# Patient Record
Sex: MALE | Race: BLACK OR AFRICAN AMERICAN | Employment: FULL TIME | URBAN - METROPOLITAN AREA
[De-identification: names, ages, dates, MRNs, and addresses within clinical notes are randomized per-mention and may not be internally consistent; named-entity substitution may affect disease eponyms.]

---

## 2018-02-12 ENCOUNTER — OFFICE VISIT (OUTPATIENT)
Dept: FAMILY MEDICINE CLINIC | Facility: CLINIC | Age: 28
End: 2018-02-12

## 2018-02-12 VITALS
SYSTOLIC BLOOD PRESSURE: 160 MMHG | HEART RATE: 96 BPM | TEMPERATURE: 98 F | DIASTOLIC BLOOD PRESSURE: 125 MMHG | WEIGHT: 315 LBS | HEIGHT: 76 IN | BODY MASS INDEX: 38.36 KG/M2

## 2018-02-12 DIAGNOSIS — I10 ESSENTIAL HYPERTENSION: Primary | ICD-10-CM

## 2018-02-12 DIAGNOSIS — Z02.89 ENCOUNTER FOR PHYSICAL EXAMINATION RELATED TO EMPLOYMENT: ICD-10-CM

## 2018-02-12 DIAGNOSIS — Z99.89 OSA ON CPAP: ICD-10-CM

## 2018-02-12 DIAGNOSIS — E66.01 MORBID OBESITY WITH BMI OF 40.0-44.9, ADULT (HCC): ICD-10-CM

## 2018-02-12 DIAGNOSIS — G47.33 OSA ON CPAP: ICD-10-CM

## 2018-02-12 PROCEDURE — 99214 OFFICE O/P EST MOD 30 MIN: CPT | Performed by: FAMILY MEDICINE

## 2018-02-12 PROCEDURE — 99212 OFFICE O/P EST SF 10 MIN: CPT | Performed by: FAMILY MEDICINE

## 2018-02-12 RX ORDER — LOSARTAN POTASSIUM AND HYDROCHLOROTHIAZIDE 25; 100 MG/1; MG/1
1 TABLET ORAL DAILY
Qty: 90 TABLET | Refills: 1 | Status: SHIPPED | OUTPATIENT
Start: 2018-02-12 | End: 2019-02-07

## 2018-02-12 NOTE — PATIENT INSTRUCTIONS
Monitor blood pressures and record at home. Limit salt intake. Medication reviewed and renewed where needed and appropriate. Comply with medications. Recommend weight loss via daily exercising and consistent healthy dietary changes.   Sleep CPAP titratio

## 2018-02-12 NOTE — PROGRESS NOTES
HPI:    Patient ID: Jimenez Brooke is a 24year old male. 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 Hypertension   This is a chronic problem.  The current gallop. Edema not present. Carotid bruit not present. Pulmonary/Chest: Effort normal and breath sounds normal. No respiratory distress. Neurological: He is alert and oriented to person, place, and time.  No cranial nerve deficit, sensory deficit or mo

## 2018-02-26 ENCOUNTER — NURSE TRIAGE (OUTPATIENT)
Dept: OTHER | Age: 28
End: 2018-02-26

## 2018-02-26 NOTE — TELEPHONE ENCOUNTER
Action Requested: Summary for Provider     []  Critical Lab, Recommendations Needed  [] Need Additional Advice  []   FYI    []   Need Orders  [] Need Medications Sent to Pharmacy  []  Other     SUMMARY: Pt asking to schedule appt with Dr. Anuja potter

## 2018-02-28 ENCOUNTER — OFFICE VISIT (OUTPATIENT)
Dept: FAMILY MEDICINE CLINIC | Facility: CLINIC | Age: 28
End: 2018-02-28

## 2018-02-28 VITALS
SYSTOLIC BLOOD PRESSURE: 169 MMHG | HEART RATE: 81 BPM | DIASTOLIC BLOOD PRESSURE: 103 MMHG | HEIGHT: 76 IN | WEIGHT: 315 LBS | BODY MASS INDEX: 38.36 KG/M2 | RESPIRATION RATE: 14 BRPM | TEMPERATURE: 98 F

## 2018-02-28 DIAGNOSIS — I10 ESSENTIAL HYPERTENSION: ICD-10-CM

## 2018-02-28 DIAGNOSIS — M25.571 ACUTE RIGHT ANKLE PAIN: Primary | ICD-10-CM

## 2018-02-28 PROCEDURE — 99214 OFFICE O/P EST MOD 30 MIN: CPT | Performed by: FAMILY MEDICINE

## 2018-02-28 PROCEDURE — 99212 OFFICE O/P EST SF 10 MIN: CPT | Performed by: FAMILY MEDICINE

## 2018-03-06 NOTE — PROGRESS NOTES
HPI:    Cayla Mathews is a 24year old male presents to clinic with right sided ankle pain. 3 weeks back, patient was working and he twisted his ankle. States that he was able to get up and walk but it was very painful.  Notes swelling that was worse the ne thyromegaly present. Cardiovascular: Normal rate, regular rhythm and normal heart sounds. Pulmonary/Chest: Effort normal and breath sounds normal. No respiratory distress. He has no wheezes. He has no rales.    Musculoskeletal:   Right Ankle - mild dif

## 2018-03-15 ENCOUNTER — OFFICE VISIT (OUTPATIENT)
Dept: FAMILY MEDICINE CLINIC | Facility: CLINIC | Age: 28
End: 2018-03-15

## 2018-03-15 VITALS
TEMPERATURE: 98 F | HEART RATE: 87 BPM | BODY MASS INDEX: 38.36 KG/M2 | DIASTOLIC BLOOD PRESSURE: 80 MMHG | RESPIRATION RATE: 17 BRPM | SYSTOLIC BLOOD PRESSURE: 110 MMHG | WEIGHT: 315 LBS | HEIGHT: 76 IN

## 2018-03-15 DIAGNOSIS — M25.571 CHRONIC PAIN OF RIGHT ANKLE: Primary | ICD-10-CM

## 2018-03-15 DIAGNOSIS — M79.671 CHRONIC PAIN IN RIGHT FOOT: ICD-10-CM

## 2018-03-15 DIAGNOSIS — Z99.89 OSA ON CPAP: ICD-10-CM

## 2018-03-15 DIAGNOSIS — G47.33 OSA ON CPAP: ICD-10-CM

## 2018-03-15 DIAGNOSIS — G89.29 CHRONIC PAIN OF RIGHT ANKLE: Primary | ICD-10-CM

## 2018-03-15 DIAGNOSIS — M76.821 POSTERIOR TIBIAL TENDINITIS OF RIGHT LEG: ICD-10-CM

## 2018-03-15 DIAGNOSIS — R09.81 NASAL SINUS CONGESTION: ICD-10-CM

## 2018-03-15 DIAGNOSIS — I10 ESSENTIAL HYPERTENSION: ICD-10-CM

## 2018-03-15 DIAGNOSIS — S86.311A STRAIN OF PERONEAL TENDON OF RIGHT FOOT, INITIAL ENCOUNTER: ICD-10-CM

## 2018-03-15 DIAGNOSIS — G89.29 CHRONIC PAIN IN RIGHT FOOT: ICD-10-CM

## 2018-03-15 PROCEDURE — 99214 OFFICE O/P EST MOD 30 MIN: CPT | Performed by: FAMILY MEDICINE

## 2018-03-15 PROCEDURE — 99212 OFFICE O/P EST SF 10 MIN: CPT | Performed by: FAMILY MEDICINE

## 2018-03-15 RX ORDER — FLUTICASONE PROPIONATE 50 MCG
2 SPRAY, SUSPENSION (ML) NASAL DAILY
Qty: 1 BOTTLE | Refills: 5 | Status: SHIPPED | OUTPATIENT
Start: 2018-03-15 | End: 2019-03-10

## 2018-03-15 NOTE — PROGRESS NOTES
HPI:    Patient ID: David Slater is a 24year old male. Ankle Injury    The incident occurred more than 1 week ago. Incident location: undetermined. Injury mechanism: Not sure of mechanism. The pain is present in the right foot and right ankle.  The quali Take 1 tablet (5 mg total) by mouth daily.  Disp: 30 tablet Rfl: 1     Allergies:No Known Allergies     03/15/18  1007 03/15/18  1032   BP: (!) 154/107 110/80   Pulse: 87    Resp: 17    Temp: 97.8 °F (36.6 °C)    TempSrc: Oral    Weight: (!) 340 lb (154.2 k Monitor blood pressures and record at home. Limit salt intake. Recommend weight loss via daily exercising and consistent healthy dietary changes. Medication reviewed and renewed where needed and appropriate. Comply with medications.   Keep CPAP titrati

## 2018-03-15 NOTE — PATIENT INSTRUCTIONS
Monitor blood pressures and record at home. Limit salt intake. Recommend weight loss via daily exercising and consistent healthy dietary changes. Medication reviewed and renewed where needed and appropriate. Comply with medications.   Keep CPAP titration

## 2018-04-03 ENCOUNTER — HOSPITAL ENCOUNTER (OUTPATIENT)
Dept: GENERAL RADIOLOGY | Facility: HOSPITAL | Age: 28
Discharge: HOME | End: 2018-04-03
Attending: ORTHOPAEDIC SURGERY
Payer: COMMERCIAL

## 2018-04-03 ENCOUNTER — OFFICE VISIT (OUTPATIENT)
Dept: ORTHOPEDICS CLINIC | Facility: CLINIC | Age: 28
End: 2018-04-03

## 2018-04-03 DIAGNOSIS — M79.671 RIGHT FOOT PAIN: ICD-10-CM

## 2018-04-03 DIAGNOSIS — M21.41 ACQUIRED PES PLANUS, RIGHT: Primary | ICD-10-CM

## 2018-04-03 DIAGNOSIS — M25.571 RIGHT ANKLE PAIN, UNSPECIFIED CHRONICITY: ICD-10-CM

## 2018-04-03 PROCEDURE — 99243 OFF/OP CNSLTJ NEW/EST LOW 30: CPT | Performed by: ORTHOPAEDIC SURGERY

## 2018-04-03 PROCEDURE — 99212 OFFICE O/P EST SF 10 MIN: CPT | Performed by: ORTHOPAEDIC SURGERY

## 2018-04-03 PROCEDURE — 73630 X-RAY EXAM OF FOOT: CPT | Performed by: ORTHOPAEDIC SURGERY

## 2018-04-03 PROCEDURE — 73610 X-RAY EXAM OF ANKLE: CPT | Performed by: ORTHOPAEDIC SURGERY

## 2018-04-03 NOTE — PROGRESS NOTES
4/3/2018  Juan Taylor  12/26/1990  24year old   male  Grayson Berman MD    HPI:   Patient presents with:   Ankle Pain: Right - onset about 1 mo ago while running on the tredmill when he lost his balance and after that the ankle and foot  was hurting - tobacco: Never Used                      Alcohol use: Yes           0.0 oz/week     Comment: social          REVIEW OF SYSTEMS:   A 12 point review of systems was performed as documented on the intake form and reviewed by me today with pertinent positives needed. All of their questions were answered and they are in agreement with the treatment plan.

## 2019-01-01 ENCOUNTER — E-VISIT (OUTPATIENT)
Dept: FAMILY MEDICINE CLINIC | Facility: CLINIC | Age: 29
End: 2019-01-01

## 2019-01-01 DIAGNOSIS — K52.9 GASTROENTERITIS: Primary | ICD-10-CM

## 2019-01-01 RX ORDER — ONDANSETRON 4 MG/1
4 TABLET, FILM COATED ORAL EVERY 8 HOURS PRN
Qty: 12 TABLET | Refills: 0 | Status: SHIPPED | OUTPATIENT
Start: 2019-01-01 | End: 2019-01-04

## 2019-01-01 NOTE — PATIENT INSTRUCTIONS
Noninfectious Gastroenteritis (Adult)    Gastroenteritis can cause nausea, vomiting, diarrhea, and cramping in the belly.  This may occur from food sensitivity, inflammation of your gastrointestinal tract, medicines, stress, or other causes not related to · If you eat, avoid fatty, greasy, spicy, or fried foods. · Don't eat dairy products if you have diarrhea; they can make the diarrhea worse.   During the first 24 hours (the first full day), follow the diet below:  · Beverages: Water, clear liquids, soft d · Stiff neck  When to seek medical advice  Call your healthcare provider right away if any of these occur:   · Increasing belly pain or constant lower right belly pain  · Continued vomiting (unable to keep liquids down)  · Frequent diarrhea (more than 5 ti

## 2019-01-01 NOTE — PROGRESS NOTES
Patient c/o NVD for a few days, denies melena or hematochezia. Denies severe abdominal pain. Able to maintain fluids. Mentions diarrhea improving. Rx for zofran, work note provided. Likely viral origin vs food poisoning.  Advised f/u in a few days if diarrh

## 2019-04-11 ENCOUNTER — APPOINTMENT (OUTPATIENT)
Dept: LAB | Facility: HOSPITAL | Age: 29
End: 2019-04-11
Attending: FAMILY MEDICINE

## 2019-04-11 ENCOUNTER — OFFICE VISIT (OUTPATIENT)
Dept: FAMILY MEDICINE CLINIC | Facility: CLINIC | Age: 29
End: 2019-04-11

## 2019-04-11 VITALS
HEART RATE: 83 BPM | BODY MASS INDEX: 37.58 KG/M2 | SYSTOLIC BLOOD PRESSURE: 166 MMHG | WEIGHT: 315 LBS | DIASTOLIC BLOOD PRESSURE: 98 MMHG | HEIGHT: 76.75 IN | TEMPERATURE: 97 F

## 2019-04-11 DIAGNOSIS — G47.33 OSA (OBSTRUCTIVE SLEEP APNEA): ICD-10-CM

## 2019-04-11 DIAGNOSIS — I10 ESSENTIAL HYPERTENSION: ICD-10-CM

## 2019-04-11 DIAGNOSIS — Z00.00 ANNUAL PHYSICAL EXAM: Primary | ICD-10-CM

## 2019-04-11 PROCEDURE — 90715 TDAP VACCINE 7 YRS/> IM: CPT | Performed by: FAMILY MEDICINE

## 2019-04-11 PROCEDURE — 36415 COLL VENOUS BLD VENIPUNCTURE: CPT

## 2019-04-11 PROCEDURE — 99395 PREV VISIT EST AGE 18-39: CPT | Performed by: FAMILY MEDICINE

## 2019-04-11 PROCEDURE — 90471 IMMUNIZATION ADMIN: CPT | Performed by: FAMILY MEDICINE

## 2019-04-11 RX ORDER — LOSARTAN POTASSIUM AND HYDROCHLOROTHIAZIDE 12.5; 5 MG/1; MG/1
1 TABLET ORAL DAILY
Qty: 90 TABLET | Refills: 0 | Status: SHIPPED | OUTPATIENT
Start: 2019-04-11 | End: 2019-06-26

## 2019-04-16 NOTE — PROGRESS NOTES
HPI:    Flakita Lira is a 34year old male presents to clinic as a new patient to establish care. Has a history of hypertension and sleep apnea. Has not taken medication or used a CPAP machine in many months.  Patient denies HAs, blurry vision, nausea, v atraumatic.    Right Ear: Tympanic membrane, external ear and ear canal normal.   Left Ear: Tympanic membrane, external ear and ear canal normal.   Nose: Nose normal.   Mouth/Throat: Uvula is midline, oropharynx is clear and moist and mucous membranes are n REFLEX TO FREE T4 [75756][Q]      TETANUS, DIPHTHERIA TOXOIDS AND ACELLULAR PERTUSIS VACCINE (TDAP), >7 YEARS, IM USE      Chlamydia/Gc Amplification [74245][Q]      Meds This Visit:  Requested Prescriptions     Signed Prescriptions Disp Refills   • Shayna

## 2019-04-17 ENCOUNTER — OFFICE VISIT (OUTPATIENT)
Dept: FAMILY MEDICINE CLINIC | Facility: CLINIC | Age: 29
End: 2019-04-17
Payer: COMMERCIAL

## 2019-04-17 VITALS
DIASTOLIC BLOOD PRESSURE: 98 MMHG | WEIGHT: 315 LBS | SYSTOLIC BLOOD PRESSURE: 164 MMHG | HEIGHT: 76.75 IN | HEART RATE: 85 BPM | BODY MASS INDEX: 37.58 KG/M2 | TEMPERATURE: 98 F

## 2019-04-17 DIAGNOSIS — I10 ESSENTIAL HYPERTENSION: Primary | ICD-10-CM

## 2019-04-17 PROCEDURE — 99212 OFFICE O/P EST SF 10 MIN: CPT | Performed by: FAMILY MEDICINE

## 2019-04-17 PROCEDURE — 99213 OFFICE O/P EST LOW 20 MIN: CPT | Performed by: FAMILY MEDICINE

## 2019-04-18 NOTE — PROGRESS NOTES
HPI:    Hamida Renteria is a 34year old male presents to clinic for follow-up regarding hypertension. Since last visit, patient has been taking medications daily. Also, has stopped eating fast food, fried foods and is exercising almost daily.   Denies side reinforced. Will double dose of medication for the next week, patient will take 2 tablets of losartan-hydrochlorothiazide daily. Will follow-up in 1 week or sooner if needed. Patient verbalized understanding of information discussed.  No barriers to

## 2019-05-20 ENCOUNTER — OFFICE VISIT (OUTPATIENT)
Dept: FAMILY MEDICINE CLINIC | Facility: CLINIC | Age: 29
End: 2019-05-20
Payer: COMMERCIAL

## 2019-05-20 VITALS
BODY MASS INDEX: 37.58 KG/M2 | TEMPERATURE: 98 F | DIASTOLIC BLOOD PRESSURE: 110 MMHG | HEIGHT: 76.75 IN | OXYGEN SATURATION: 99 % | SYSTOLIC BLOOD PRESSURE: 164 MMHG | HEART RATE: 85 BPM | WEIGHT: 315 LBS

## 2019-05-20 DIAGNOSIS — R79.89 ELEVATED SERUM CREATININE: ICD-10-CM

## 2019-05-20 DIAGNOSIS — I10 ESSENTIAL HYPERTENSION: Primary | ICD-10-CM

## 2019-05-20 PROCEDURE — 99213 OFFICE O/P EST LOW 20 MIN: CPT | Performed by: FAMILY MEDICINE

## 2019-05-20 PROCEDURE — 99212 OFFICE O/P EST SF 10 MIN: CPT | Performed by: FAMILY MEDICINE

## 2019-05-20 RX ORDER — AMLODIPINE BESYLATE 5 MG/1
5 TABLET ORAL DAILY
Qty: 90 TABLET | Refills: 0 | Status: SHIPPED | OUTPATIENT
Start: 2019-05-20 | End: 2019-06-26

## 2019-05-20 NOTE — PROGRESS NOTES
HPI:    Justin Hollins is a 34year old male presents to clinic for follow up regarding HTN. Reports compliance with medications, has missed 2 doses since last visit.  Patient denies HAs, blurry vision, nausea, vomiting, CP, palpitations, dizziness, SOB, or reviewed. ASSESSMENT/PLAN:   (I10) Essential hypertension  (primary encounter diagnosis)  Plan:  - BP elevated. Amlodipine 5 mg added. Continued lifestyle modifications advised.  To follow up in 2 weeks or sooner PRN.     (R88.23) Elevated serum creati

## 2019-06-26 ENCOUNTER — OFFICE VISIT (OUTPATIENT)
Dept: FAMILY MEDICINE CLINIC | Facility: CLINIC | Age: 29
End: 2019-06-26
Payer: COMMERCIAL

## 2019-06-26 VITALS
SYSTOLIC BLOOD PRESSURE: 172 MMHG | WEIGHT: 315 LBS | DIASTOLIC BLOOD PRESSURE: 120 MMHG | BODY MASS INDEX: 37.58 KG/M2 | HEIGHT: 76.75 IN | TEMPERATURE: 98 F | HEART RATE: 74 BPM | OXYGEN SATURATION: 100 %

## 2019-06-26 DIAGNOSIS — I10 UNCONTROLLED HYPERTENSION: Primary | ICD-10-CM

## 2019-06-26 PROCEDURE — 99214 OFFICE O/P EST MOD 30 MIN: CPT | Performed by: FAMILY MEDICINE

## 2019-06-26 PROCEDURE — 93000 ELECTROCARDIOGRAM COMPLETE: CPT | Performed by: FAMILY MEDICINE

## 2019-06-26 PROCEDURE — 99212 OFFICE O/P EST SF 10 MIN: CPT | Performed by: FAMILY MEDICINE

## 2019-06-26 PROCEDURE — 93005 ELECTROCARDIOGRAM TRACING: CPT | Performed by: FAMILY MEDICINE

## 2019-06-26 RX ORDER — LOSARTAN POTASSIUM AND HYDROCHLOROTHIAZIDE 12.5; 5 MG/1; MG/1
1 TABLET ORAL DAILY
Qty: 90 TABLET | Refills: 0 | Status: SHIPPED | OUTPATIENT
Start: 2019-06-26 | End: 2019-09-10 | Stop reason: DRUGHIGH

## 2019-06-26 RX ORDER — LISINOPRIL 10 MG/1
10 TABLET ORAL DAILY
Qty: 90 TABLET | Refills: 0 | Status: SHIPPED | OUTPATIENT
Start: 2019-06-26 | End: 2019-10-07

## 2019-06-26 NOTE — PROGRESS NOTES
HPI:    Forrest Baeza is a 34year old male presents to clinic for follow up regarding HTN. Has not taken meds in about a month due to insurance issues. Has tried to limit salt in his diet and exercise.  Patient denies HAs, blurry vision, nausea, vomiting, hypertension  (primary encounter diagnosis)  Plan: ELECTROCARDIOGRAM, COMPLETE  - BP elevated, patient asymptomatic. EKG done in clinic - no acute changes. Normal sinus rhythm, normal rate. Losartan - HCTZ refilled, lisinopril started.  Patient's creatinine

## 2019-07-01 ENCOUNTER — APPOINTMENT (OUTPATIENT)
Dept: LAB | Age: 29
End: 2019-07-01
Attending: FAMILY MEDICINE
Payer: COMMERCIAL

## 2019-07-01 ENCOUNTER — OFFICE VISIT (OUTPATIENT)
Dept: FAMILY MEDICINE CLINIC | Facility: CLINIC | Age: 29
End: 2019-07-01
Payer: COMMERCIAL

## 2019-07-01 VITALS
SYSTOLIC BLOOD PRESSURE: 148 MMHG | DIASTOLIC BLOOD PRESSURE: 110 MMHG | HEIGHT: 76.75 IN | BODY MASS INDEX: 37.58 KG/M2 | WEIGHT: 315 LBS | TEMPERATURE: 98 F | HEART RATE: 79 BPM

## 2019-07-01 DIAGNOSIS — I10 ESSENTIAL HYPERTENSION: Primary | ICD-10-CM

## 2019-07-01 PROCEDURE — 99212 OFFICE O/P EST SF 10 MIN: CPT | Performed by: FAMILY MEDICINE

## 2019-07-01 PROCEDURE — 99213 OFFICE O/P EST LOW 20 MIN: CPT | Performed by: FAMILY MEDICINE

## 2019-07-01 NOTE — PROGRESS NOTES
HPI:    Jefry Ramirez is a 34year old male presents to clinic for follow-up regarding hypertension. Reports compliance with both medications since last visit. Denies any symptoms or side effects.  Patient denies HAs, blurry vision, nausea, vomiting, CP, will refer to renal. Patient agreeable with plan. Responsible party/patient verbalized understanding of information discussed. No barriers to learning observed.            Orders This Visit:  Orders Placed This Encounter      Basic Metabolic Panel (8) [

## 2019-09-07 ENCOUNTER — DIAGNOSTIC TRANS (OUTPATIENT)
Dept: OTHER | Age: 29
End: 2019-09-07

## 2019-09-07 PROCEDURE — 93010 ELECTROCARDIOGRAM REPORT: CPT | Performed by: INTERNAL MEDICINE

## 2019-09-07 PROCEDURE — 99285 EMERGENCY DEPT VISIT HI MDM: CPT | Performed by: EMERGENCY MEDICINE

## 2019-09-08 PROCEDURE — 93010 ELECTROCARDIOGRAM REPORT: CPT | Performed by: INTERNAL MEDICINE

## 2019-09-10 ENCOUNTER — APPOINTMENT (OUTPATIENT)
Dept: LAB | Age: 29
End: 2019-09-10
Attending: FAMILY MEDICINE
Payer: COMMERCIAL

## 2019-09-10 ENCOUNTER — OFFICE VISIT (OUTPATIENT)
Dept: FAMILY MEDICINE CLINIC | Facility: CLINIC | Age: 29
End: 2019-09-10
Payer: COMMERCIAL

## 2019-09-10 VITALS
WEIGHT: 315 LBS | SYSTOLIC BLOOD PRESSURE: 174 MMHG | DIASTOLIC BLOOD PRESSURE: 141 MMHG | TEMPERATURE: 98 F | HEART RATE: 94 BPM | BODY MASS INDEX: 41 KG/M2 | RESPIRATION RATE: 16 BRPM

## 2019-09-10 DIAGNOSIS — F43.21 GRIEVING: ICD-10-CM

## 2019-09-10 DIAGNOSIS — I10 ESSENTIAL HYPERTENSION: Primary | ICD-10-CM

## 2019-09-10 DIAGNOSIS — R79.89 ELEVATED SERUM CREATININE: ICD-10-CM

## 2019-09-10 DIAGNOSIS — K21.9 GASTROESOPHAGEAL REFLUX DISEASE, ESOPHAGITIS PRESENCE NOT SPECIFIED: ICD-10-CM

## 2019-09-10 LAB
ALBUMIN SERPL-MCNC: 4 G/DL (ref 3.4–5)
ANION GAP SERPL CALC-SCNC: 5 MMOL/L (ref 0–18)
BUN BLD-MCNC: 12 MG/DL (ref 7–18)
BUN/CREAT SERPL: 7.8 (ref 10–20)
CALCIUM BLD-MCNC: 9.3 MG/DL (ref 8.5–10.1)
CHLORIDE SERPL-SCNC: 102 MMOL/L (ref 98–112)
CO2 SERPL-SCNC: 31 MMOL/L (ref 21–32)
CREAT BLD-MCNC: 1.53 MG/DL (ref 0.7–1.3)
GLUCOSE BLD-MCNC: 91 MG/DL (ref 70–99)
OSMOLALITY SERPL CALC.SUM OF ELEC: 285 MOSM/KG (ref 275–295)
PHOSPHATE SERPL-MCNC: 3.1 MG/DL (ref 2.5–4.9)
POTASSIUM SERPL-SCNC: 3.8 MMOL/L (ref 3.5–5.1)
SODIUM SERPL-SCNC: 138 MMOL/L (ref 136–145)

## 2019-09-10 PROCEDURE — 90471 IMMUNIZATION ADMIN: CPT | Performed by: FAMILY MEDICINE

## 2019-09-10 PROCEDURE — 80069 RENAL FUNCTION PANEL: CPT

## 2019-09-10 PROCEDURE — 99214 OFFICE O/P EST MOD 30 MIN: CPT | Performed by: FAMILY MEDICINE

## 2019-09-10 PROCEDURE — 90686 IIV4 VACC NO PRSV 0.5 ML IM: CPT | Performed by: FAMILY MEDICINE

## 2019-09-10 PROCEDURE — 36415 COLL VENOUS BLD VENIPUNCTURE: CPT

## 2019-09-10 RX ORDER — LOSARTAN POTASSIUM AND HYDROCHLOROTHIAZIDE 25; 100 MG/1; MG/1
1 TABLET ORAL DAILY
Qty: 90 TABLET | Refills: 0 | Status: SHIPPED | OUTPATIENT
Start: 2019-09-10 | End: 2019-11-08

## 2019-09-10 NOTE — PROGRESS NOTES
HPI:    Emily Baez is a 34year old male presents to clinic for follow-up regarding hypertension. Reports compliance with medications, denies side effects.   Patient's grandfather recently passed, is under a lot of stress, constantly feels anxious/sad b kg)     Physical Exam   Constitutional: No distress. HENT:   Head: Normocephalic and atraumatic.    Right Ear: Tympanic membrane, external ear and ear canal normal.   Left Ear: Tympanic membrane, external ear and ear canal normal.   Nose: Nose normal.   M year.  Patient will monitor this on his own for now, follow-up as needed    Responsible party/patient verbalized understanding of information discussed. No barriers to learning observed.         Orders This Visit:  Orders Placed This Encounter      Renal Fu

## 2019-09-10 NOTE — PATIENT INSTRUCTIONS
GERD (Adult)    The esophagus is a tube that carries food from the mouth to the stomach. A valve (the LES, lower esophageal sphincter) at the lower end of the esophagus prevents stomach acid from flowing upward.  When this valve doesn't work properly, sto · If your symptoms occur during sleep, use a foam wedge to elevate your upper body (not just your head.) Or, place 4\" blocks under the head of your bed. Or use 2 bed risers under your bedframe.   Medicines  If needed, medicines can help relieve the symptom © 1038-3286 [employer] [address]. [address] [facility], [address] [place]. All rights reserved. This information is not intended as a substitute for professional medical care. Always follow your healthcare professional's instructions.         Tips to · Try limiting chocolate, peppermint, and spearmint. These can worsen acid reflux in some people. Watch when you eat  · Avoid lying down for 3 hours after eating. · Do not snack before going to bed.   Raise your head  Raising your head and upper body by 4 Lifestyle changes can help reduce symptoms. If needed, your healthcare provider may prescribe medicines. Symptoms often improve with treatment, but if treatment is stopped, the symptoms often return after a few months.  So most persons with GERD will need t Follow up with your healthcare provider or as advised by our staff.   When to seek medical advice  Call your healthcare provider if any of the following occur:  · Stomach pain gets worse or moves to the lower right abdomen (appendix area)  · Chest pain appe

## 2019-09-24 ENCOUNTER — OFFICE VISIT (OUTPATIENT)
Dept: FAMILY MEDICINE CLINIC | Facility: CLINIC | Age: 29
End: 2019-09-24
Payer: COMMERCIAL

## 2019-09-24 VITALS
RESPIRATION RATE: 16 BRPM | DIASTOLIC BLOOD PRESSURE: 98 MMHG | BODY MASS INDEX: 41 KG/M2 | SYSTOLIC BLOOD PRESSURE: 150 MMHG | HEART RATE: 77 BPM | WEIGHT: 315 LBS | TEMPERATURE: 98 F

## 2019-09-24 DIAGNOSIS — I10 ESSENTIAL HYPERTENSION: Primary | ICD-10-CM

## 2019-09-24 PROCEDURE — 99213 OFFICE O/P EST LOW 20 MIN: CPT | Performed by: FAMILY MEDICINE

## 2019-09-24 NOTE — PROGRESS NOTES
HPI:    Sandie Olvera is a 34year old male presents to clinic for follow-up regarding hypertension. Reports compliance with medications, denies side effects. Has been trying to avoid fast foods, salty foods. Is exercising 2-3 times a week on average. normal.   Neck: Normal range of motion. Neck supple. No thyromegaly present. Cardiovascular: Normal rate, regular rhythm and normal heart sounds. No murmur heard. Pulmonary/Chest: Effort normal and breath sounds normal. No respiratory distress.  He has

## 2019-10-07 ENCOUNTER — PATIENT MESSAGE (OUTPATIENT)
Dept: FAMILY MEDICINE CLINIC | Facility: CLINIC | Age: 29
End: 2019-10-07

## 2019-10-07 ENCOUNTER — OFFICE VISIT (OUTPATIENT)
Dept: NEPHROLOGY | Facility: CLINIC | Age: 29
End: 2019-10-07
Payer: COMMERCIAL

## 2019-10-07 VITALS
SYSTOLIC BLOOD PRESSURE: 153 MMHG | DIASTOLIC BLOOD PRESSURE: 98 MMHG | BODY MASS INDEX: 38.36 KG/M2 | TEMPERATURE: 97 F | HEIGHT: 76 IN | HEART RATE: 91 BPM | WEIGHT: 315 LBS

## 2019-10-07 DIAGNOSIS — N18.2 CKD (CHRONIC KIDNEY DISEASE) STAGE 2, GFR 60-89 ML/MIN: Primary | ICD-10-CM

## 2019-10-07 PROCEDURE — 99243 OFF/OP CNSLTJ NEW/EST LOW 30: CPT | Performed by: INTERNAL MEDICINE

## 2019-10-07 RX ORDER — AMLODIPINE BESYLATE 10 MG/1
10 TABLET ORAL DAILY
Qty: 90 TABLET | Refills: 1 | Status: SHIPPED | OUTPATIENT
Start: 2019-10-07 | End: 2020-04-24

## 2019-10-08 NOTE — TELEPHONE ENCOUNTER
Clarified message with patient. Patient was looking for order for CPAP titration test. Order in the system from 04/2019 and patient provided with telephone number.

## 2019-10-08 NOTE — TELEPHONE ENCOUNTER
From: Greta Pete  To: Roxane Gerard MD  Sent: 10/7/2019 2:51 PM CDT  Subject: Referral Rohini Quiles is it possible to get a sleep apnea referral

## 2019-10-09 ENCOUNTER — ORDER TRANSCRIPTION (OUTPATIENT)
Dept: SLEEP CENTER | Age: 29
End: 2019-10-09

## 2019-10-09 ENCOUNTER — PATIENT MESSAGE (OUTPATIENT)
Dept: NEPHROLOGY | Facility: CLINIC | Age: 29
End: 2019-10-09

## 2019-10-09 DIAGNOSIS — G47.33 OSA (OBSTRUCTIVE SLEEP APNEA): Primary | ICD-10-CM

## 2019-10-09 NOTE — TELEPHONE ENCOUNTER
From: Deondre Dougherty  To: Corinne Junior, MD  Sent: 10/9/2019 12:57 PM CDT  Subject: Visit Cecy Rod do you think I can get a doctor note for Monday my job is asking for one

## 2019-10-10 ENCOUNTER — TELEPHONE (OUTPATIENT)
Dept: NEPHROLOGY | Facility: CLINIC | Age: 29
End: 2019-10-10

## 2019-10-10 NOTE — PROGRESS NOTES
DEAR Loco  Thank you for the referral of Lavinia Marie . Bharti Tran is an  intelligent 32-year-old -American man  Here to evaluate some renal insufficiency and hypertension. Does suffer from obesity his height is 6 4 his weight is 342 with a BMI of 42.   He etc. keep blood pressure less than 140/80 and we will see him back in a month    We will avoid nonsteroidals    Thank you very much Loco Pandey

## 2019-10-10 NOTE — TELEPHONE ENCOUNTER
Loco   please see my consult on Cayla Mathews   hope you are well     regards,    Gabriela Whittaker

## 2019-10-12 ENCOUNTER — OFFICE VISIT (OUTPATIENT)
Dept: SLEEP CENTER | Age: 29
End: 2019-10-12
Attending: FAMILY MEDICINE
Payer: COMMERCIAL

## 2019-10-12 ENCOUNTER — APPOINTMENT (OUTPATIENT)
Dept: GENERAL RADIOLOGY | Facility: HOSPITAL | Age: 29
End: 2019-10-12
Attending: EMERGENCY MEDICINE
Payer: COMMERCIAL

## 2019-10-12 ENCOUNTER — HOSPITAL ENCOUNTER (EMERGENCY)
Facility: HOSPITAL | Age: 29
Discharge: HOME | End: 2019-10-12
Attending: EMERGENCY MEDICINE
Payer: COMMERCIAL

## 2019-10-12 VITALS
BODY MASS INDEX: 42 KG/M2 | OXYGEN SATURATION: 98 % | TEMPERATURE: 98 F | HEART RATE: 69 BPM | WEIGHT: 315 LBS | SYSTOLIC BLOOD PRESSURE: 125 MMHG | RESPIRATION RATE: 15 BRPM | DIASTOLIC BLOOD PRESSURE: 86 MMHG

## 2019-10-12 DIAGNOSIS — R07.9 CHEST PAIN OF UNCERTAIN ETIOLOGY: Primary | ICD-10-CM

## 2019-10-12 DIAGNOSIS — Z76.89 SLEEP CONCERN: Primary | ICD-10-CM

## 2019-10-12 DIAGNOSIS — G47.33 OSA (OBSTRUCTIVE SLEEP APNEA): ICD-10-CM

## 2019-10-12 PROCEDURE — 93010 ELECTROCARDIOGRAM REPORT: CPT | Performed by: EMERGENCY MEDICINE

## 2019-10-12 PROCEDURE — 36415 COLL VENOUS BLD VENIPUNCTURE: CPT

## 2019-10-12 PROCEDURE — 84484 ASSAY OF TROPONIN QUANT: CPT

## 2019-10-12 PROCEDURE — 85025 COMPLETE CBC W/AUTO DIFF WBC: CPT | Performed by: EMERGENCY MEDICINE

## 2019-10-12 PROCEDURE — 85060 BLOOD SMEAR INTERPRETATION: CPT | Performed by: EMERGENCY MEDICINE

## 2019-10-12 PROCEDURE — 84484 ASSAY OF TROPONIN QUANT: CPT | Performed by: EMERGENCY MEDICINE

## 2019-10-12 PROCEDURE — 93005 ELECTROCARDIOGRAM TRACING: CPT

## 2019-10-12 PROCEDURE — 85025 COMPLETE CBC W/AUTO DIFF WBC: CPT

## 2019-10-12 PROCEDURE — 99284 EMERGENCY DEPT VISIT MOD MDM: CPT

## 2019-10-12 PROCEDURE — 80048 BASIC METABOLIC PNL TOTAL CA: CPT

## 2019-10-12 PROCEDURE — 80048 BASIC METABOLIC PNL TOTAL CA: CPT | Performed by: EMERGENCY MEDICINE

## 2019-10-12 PROCEDURE — 85060 BLOOD SMEAR INTERPRETATION: CPT

## 2019-10-12 PROCEDURE — 71046 X-RAY EXAM CHEST 2 VIEWS: CPT | Performed by: EMERGENCY MEDICINE

## 2019-10-12 PROCEDURE — 95811 POLYSOM 6/>YRS CPAP 4/> PARM: CPT

## 2019-10-12 NOTE — ED INITIAL ASSESSMENT (HPI)
Pt reports chest pain, arm pain, and feeling lightheaded/dizziness.  After taking home BP medications

## 2019-10-12 NOTE — ED PROVIDER NOTES
Patient Seen in: [facility] AND [facility] Emergency Department      History   Patient presents with:  Chest Pain Angina (cardiovascular)    Stated Complaint: CHEST PAIN    HPI    61-year-old male patient presents complaining of left sided chest pain near the sh oropharynx  Heart: Regular rate and rhythm, no murmur  Lungs: Normal respiratory effort, clear lungs  Abdomen: Soft,  nondistended, non tender  : No CVA tenderness  Skin: No rash, no lesions  Musculoskeletal: Symmetric, no deformity, no injuries  Neuro: Bailey Ervin [address]    Schedule an appointment as soon as possible for a visit in 2 days  For evaluation and possible referral to see a cardiologist        Medications Prescribed:  Current Discharge Medication List

## 2019-10-16 ENCOUNTER — OFFICE VISIT (OUTPATIENT)
Dept: FAMILY MEDICINE CLINIC | Facility: CLINIC | Age: 29
End: 2019-10-16
Payer: COMMERCIAL

## 2019-10-16 VITALS
HEART RATE: 88 BPM | RESPIRATION RATE: 16 BRPM | SYSTOLIC BLOOD PRESSURE: 140 MMHG | BODY MASS INDEX: 38.36 KG/M2 | DIASTOLIC BLOOD PRESSURE: 92 MMHG | TEMPERATURE: 98 F | WEIGHT: 315 LBS | HEIGHT: 76 IN

## 2019-10-16 DIAGNOSIS — G47.33 OSA (OBSTRUCTIVE SLEEP APNEA): ICD-10-CM

## 2019-10-16 DIAGNOSIS — I10 ESSENTIAL HYPERTENSION: Primary | ICD-10-CM

## 2019-10-16 DIAGNOSIS — K21.9 GASTROESOPHAGEAL REFLUX DISEASE, ESOPHAGITIS PRESENCE NOT SPECIFIED: ICD-10-CM

## 2019-10-16 PROCEDURE — 99214 OFFICE O/P EST MOD 30 MIN: CPT | Performed by: FAMILY MEDICINE

## 2019-10-16 RX ORDER — RANITIDINE 150 MG/1
150 TABLET ORAL 2 TIMES DAILY
Qty: 60 TABLET | Refills: 1 | Status: SHIPPED | OUTPATIENT
Start: 2019-10-16 | End: 2019-11-08

## 2019-10-16 NOTE — PROCEDURES
[address]  Accredited by the [facility] of Sleep Medicine (AASM)    PATIENT'S NAME: Zaina Neptali   ATTENDING PHYSICIAN: Harley Anaya MD   REFERRING PHYSICIAN: Harley Anaya MD   PATIENT ACCOUNT #: [id number] LOCATION: [facility] index was 4.3 events per hour and the spontaneous arousal index was 19 events per hour for a combined arousal index of 24.2 events per hour.   There were 10 periodic limb movements for a periodic limb movement index of 2.4 events per hour, of which 0.5 per

## 2019-10-16 NOTE — PROGRESS NOTES
HPI:    Rodolfo Nolasco is a 34year old male presents to clinic for ER follow-up.   Was seen in the ER Friday night with concerns regarding a burning sensation in the center of his chest.  Over the past 10 days, patient has noticed that several times a day, (!) 140/92   BP Location: Left arm    Patient Position: Sitting    Cuff Size: large    Pulse: 88    Resp: 16    Temp: 97.9 °F (36.6 °C)    TempSrc: Oral    Weight: (!) 334 lb 6 oz (151.7 kg)    Height: 6' 4\" (1.93 m)      Physical Exam   Constitutional: N Prescriptions     Signed Prescriptions Disp Refills   • raNITIdine HCl 150 MG Oral Tab 60 tablet 1     Sig: Take 1 tablet (150 mg total) by mouth 2 (two) times daily.        Imaging & Referrals:  [facility] CPAP       10/16/2019  Jameel Faust MD

## 2019-10-17 ENCOUNTER — TELEPHONE (OUTPATIENT)
Dept: FAMILY MEDICINE CLINIC | Facility: CLINIC | Age: 29
End: 2019-10-17

## 2019-10-17 NOTE — TELEPHONE ENCOUNTER
Pt. States that the excuse note for pts job was not acceptable, note needs to state that pt. was not able to work from 10/12/19 through 10/16/19. Note also needs to state why pt is not able to perform his work duties? Pt. 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

## 2019-10-24 ENCOUNTER — TELEPHONE (OUTPATIENT)
Dept: FAMILY MEDICINE CLINIC | Facility: CLINIC | Age: 29
End: 2019-10-24

## 2019-10-24 NOTE — TELEPHONE ENCOUNTER
Patient dropped off two sets of forms for completion---one for Blood Pressure and the other for Sleep Apnea,patient needs completed by 10-28-19. Placed in Dr Milo Oliver.

## 2019-10-24 NOTE — TELEPHONE ENCOUNTER
Pt dropped off FMLA forms/ signed HIPAA/ bill $25 fee.  Form scanned and placed in forms mailbox @ [employer]

## 2019-10-28 NOTE — TELEPHONE ENCOUNTER
Patient calling back to check the status of the forms he states he need them today October 28, 2019       Please advise     If possible can it be put in My Chart

## 2019-10-28 NOTE — TELEPHONE ENCOUNTER
Patient is calling regarding the status of the form. He needs them by today to turn them in. Please call him at [phone].

## 2019-10-28 NOTE — TELEPHONE ENCOUNTER
Dr. Johnson Beech    Please sign off on form:  -Highlight the patient and hit \"Chart\" button. -In Chart Review, w/in the Encounter tab - click 1 time on the Telephone call encounter for 10/24/19.  Scroll down the telephone encounter.  -Click \"scan on\" blue

## 2019-10-28 NOTE — TELEPHONE ENCOUNTER
In addition to [facility] forms patient also dropped of forms for new job applications related to hypertension and sleep apnea. I have informed Dr. Mark Llaneser of patient's multiple calls today.  I have partially filled out the forms and printed all needed docum

## 2019-10-29 NOTE — TELEPHONE ENCOUNTER
Forms completed by Dr. Delphine Marlow. Patient notified. He will  ppamilcar guajardo.  Dr. Patrick Saab and staff will be here until 7:30pm.

## 2019-11-08 ENCOUNTER — APPOINTMENT (OUTPATIENT)
Dept: LAB | Age: 29
End: 2019-11-08
Attending: FAMILY MEDICINE
Payer: COMMERCIAL

## 2019-11-08 ENCOUNTER — OFFICE VISIT (OUTPATIENT)
Dept: FAMILY MEDICINE CLINIC | Facility: CLINIC | Age: 29
End: 2019-11-08
Payer: COMMERCIAL

## 2019-11-08 VITALS
SYSTOLIC BLOOD PRESSURE: 155 MMHG | TEMPERATURE: 98 F | RESPIRATION RATE: 20 BRPM | BODY MASS INDEX: 38.36 KG/M2 | WEIGHT: 315 LBS | HEART RATE: 93 BPM | DIASTOLIC BLOOD PRESSURE: 91 MMHG | HEIGHT: 76 IN

## 2019-11-08 DIAGNOSIS — K21.9 GASTROESOPHAGEAL REFLUX DISEASE, ESOPHAGITIS PRESENCE NOT SPECIFIED: ICD-10-CM

## 2019-11-08 DIAGNOSIS — I10 ESSENTIAL HYPERTENSION: Primary | ICD-10-CM

## 2019-11-08 DIAGNOSIS — N18.2 CKD (CHRONIC KIDNEY DISEASE) STAGE 2, GFR 60-89 ML/MIN: ICD-10-CM

## 2019-11-08 DIAGNOSIS — R07.9 CHEST PAIN, UNSPECIFIED TYPE: ICD-10-CM

## 2019-11-08 PROCEDURE — 82043 UR ALBUMIN QUANTITATIVE: CPT

## 2019-11-08 PROCEDURE — 81001 URINALYSIS AUTO W/SCOPE: CPT

## 2019-11-08 PROCEDURE — 82570 ASSAY OF URINE CREATININE: CPT

## 2019-11-08 PROCEDURE — 99214 OFFICE O/P EST MOD 30 MIN: CPT | Performed by: FAMILY MEDICINE

## 2019-11-08 PROCEDURE — 36415 COLL VENOUS BLD VENIPUNCTURE: CPT

## 2019-11-08 PROCEDURE — 80069 RENAL FUNCTION PANEL: CPT

## 2019-11-08 RX ORDER — OMEPRAZOLE 20 MG/1
20 CAPSULE, DELAYED RELEASE ORAL
Qty: 90 CAPSULE | Refills: 0 | Status: SHIPPED | OUTPATIENT
Start: 2019-11-08 | End: 2020-03-20

## 2019-11-08 RX ORDER — LISINOPRIL 10 MG/1
10 TABLET ORAL DAILY
Qty: 90 TABLET | Refills: 0 | Status: SHIPPED | OUTPATIENT
Start: 2019-11-08 | End: 2019-11-21

## 2019-11-08 NOTE — PROGRESS NOTES
HPI:    Rodolfo Nolasco is a 34year old male presents to clinic for follow-up. Hypertension-chronic issue for patient. Stopped taking losartan, reports that it made him feel tired and dizzy. Is taking amlodipine daily. Denies side effects.   Blood pressu Systems   All other systems reviewed and are negative.       PHYSICAL EXAM:      11/08/19  1108   BP: (!) 155/91   Pulse: 93   Resp: 20   Temp: 97.7 °F (36.5 °C)   TempSrc: Oral   Weight: (!) 325 lb (147.4 kg)   Height: 6' 4\" (1.93 m)     Physical Exam   C monitor    Responsible party/patient verbalized understanding of information discussed. No barriers to learning observed. Orders This Visit:  No orders of the defined types were placed in this encounter.       Meds This Visit:  Requested Ankit Dukes

## 2019-11-18 ENCOUNTER — OFFICE VISIT (OUTPATIENT)
Dept: NEPHROLOGY | Facility: CLINIC | Age: 29
End: 2019-11-18
Payer: COMMERCIAL

## 2019-11-18 VITALS
SYSTOLIC BLOOD PRESSURE: 164 MMHG | HEART RATE: 85 BPM | HEIGHT: 76 IN | BODY MASS INDEX: 38.36 KG/M2 | WEIGHT: 315 LBS | DIASTOLIC BLOOD PRESSURE: 111 MMHG

## 2019-11-18 DIAGNOSIS — N18.2 CKD (CHRONIC KIDNEY DISEASE) STAGE 2, GFR 60-89 ML/MIN: ICD-10-CM

## 2019-11-18 DIAGNOSIS — I10 ESSENTIAL HYPERTENSION: Primary | ICD-10-CM

## 2019-11-18 PROCEDURE — 99213 OFFICE O/P EST LOW 20 MIN: CPT | Performed by: INTERNAL MEDICINE

## 2019-11-18 NOTE — PATIENT INSTRUCTIONS
Please continue to work on your weight and exercise    Please take your lisinopril in the morning and your amlodipine at night    Monitor blood pressure keep less than 140/90    On December 26 do a kidney ultrasound you need to schedule this ahead of time

## 2019-11-21 ENCOUNTER — TELEPHONE (OUTPATIENT)
Dept: NEPHROLOGY | Facility: CLINIC | Age: 29
End: 2019-11-21

## 2019-11-21 RX ORDER — LISINOPRIL 10 MG/1
20 TABLET ORAL DAILY
Refills: 0 | COMMUNITY
Start: 2019-11-21 | End: 2020-01-26

## 2019-11-21 NOTE — TELEPHONE ENCOUNTER
Please asked patient to raise lisinopril to 20 mg/day rather than 10 mg/day I forgot to mention this at visit thank you

## 2019-11-21 NOTE — TELEPHONE ENCOUNTER
Contacted pt and advised to increase lisinopril to 20 mg daily so take 2 tablets daily. He stated understanding. Med list updated.

## 2019-11-21 NOTE — PROGRESS NOTES
Dear Shadi Dutton is here for follow-up as you know he is a history of hypertension and morbid obesity.   He states his been checking his blood pressure at home is running about 145/90  Here in the office it was originally 164/111 and on repeat 160/96 he forg

## 2019-12-26 ENCOUNTER — OFFICE VISIT (OUTPATIENT)
Dept: GASTROENTEROLOGY | Facility: CLINIC | Age: 29
End: 2019-12-26
Payer: COMMERCIAL

## 2019-12-26 VITALS
HEIGHT: 76 IN | BODY MASS INDEX: 38.36 KG/M2 | SYSTOLIC BLOOD PRESSURE: 158 MMHG | HEART RATE: 92 BPM | WEIGHT: 315 LBS | DIASTOLIC BLOOD PRESSURE: 96 MMHG

## 2019-12-26 DIAGNOSIS — K21.9 GASTROESOPHAGEAL REFLUX DISEASE WITHOUT ESOPHAGITIS: Primary | ICD-10-CM

## 2019-12-26 PROCEDURE — 99244 OFF/OP CNSLTJ NEW/EST MOD 40: CPT | Performed by: INTERNAL MEDICINE

## 2019-12-26 RX ORDER — CHLORAL HYDRATE 500 MG
1000 CAPSULE ORAL DAILY
COMMUNITY

## 2019-12-26 NOTE — H&P
[facility], [facility] - Gastroenterology                                                                                                               Reason for consult: G social    Drug use: No       Medications (Active prior to today's visit):  Current Outpatient Medications   Medication Sig Dispense Refill   • omega-3 fatty acids 1000 MG Oral Cap Take 1,000 mg by mouth daily.      • lisinopril 10 MG Oral Tab Take 2 tablets old year-old male with history of obesity, HTN who presents for evaluation of GERD/ regurgitation. #GERD - The pathophysiology of acid reflux was discussed.  Anti-reflux measures such as raising the head of the bed, avoiding tight clothing or belts, deyvi procedure with possible intervention [i.e. polypectomy, stent placement, etc.] as indicated. Orders This Visit:  No orders of the defined types were placed in this encounter.       Meds This Visit:  Requested Prescriptions      No prescriptions requ

## 2019-12-26 NOTE — PATIENT INSTRUCTIONS
1. Avoid caffeine, chocolate, peppermint, and alcohol. Also avoid lying down flat or in a recumbent position for 3 hours after a meal.   2. Start gaviscon liquid after meals as needed  3. Avoid ibuprofen type medication  4. Reflux pillow  5.  Less constrict

## 2019-12-27 ENCOUNTER — HOSPITAL ENCOUNTER (OUTPATIENT)
Dept: ULTRASOUND IMAGING | Age: 29
Discharge: HOME | End: 2019-12-27
Attending: INTERNAL MEDICINE
Payer: COMMERCIAL

## 2019-12-27 DIAGNOSIS — I10 ESSENTIAL HYPERTENSION: ICD-10-CM

## 2019-12-27 DIAGNOSIS — N18.2 CKD (CHRONIC KIDNEY DISEASE) STAGE 2, GFR 60-89 ML/MIN: ICD-10-CM

## 2019-12-27 PROCEDURE — 76770 US EXAM ABDO BACK WALL COMP: CPT | Performed by: INTERNAL MEDICINE

## 2020-01-26 RX ORDER — LISINOPRIL 10 MG/1
TABLET ORAL
Qty: 90 TABLET | Refills: 1 | Status: SHIPPED | OUTPATIENT
Start: 2020-01-26 | End: 2020-03-20

## 2020-01-26 NOTE — TELEPHONE ENCOUNTER
Refill passed per [facility], [facility] protocol.   Hypertensive Medications  Protocol Criteria:  · Appointment scheduled in the past 6 months or in the next 3 months  · BMP or CMP in the past 12 months  · Creatinine result < 2  Recent Outpatient Visits

## 2020-02-26 ENCOUNTER — TELEPHONE (OUTPATIENT)
Dept: FAMILY MEDICINE CLINIC | Facility: CLINIC | Age: 30
End: 2020-02-26

## 2020-02-26 DIAGNOSIS — G47.33 OSA (OBSTRUCTIVE SLEEP APNEA): Primary | ICD-10-CM

## 2020-02-26 NOTE — TELEPHONE ENCOUNTER
Contacted patient to confirm what is actually needed. Per patient he does not have a CPAP machine and will need one along with supplies. I have explained our process in forwarding his information on over to [facility] for further processing, will send E contact information to patient via [employer] so he can call to check on status of his CPAP order. Patient verbalized understanding. Insurance information verified/confirmed with patient with what we have on file. Paperwork faxed to [facility], information sent to patient via [employer] message.

## 2020-02-26 NOTE — TELEPHONE ENCOUNTER
Patient got his sleep study done back in October 2019. He stated he was supposed to get a call from someone in regards to his CPAP supply. Do you have a number where he can get his CPAP supply? Please advise. Thank you.

## 2020-03-20 ENCOUNTER — OFFICE VISIT (OUTPATIENT)
Dept: FAMILY MEDICINE CLINIC | Facility: CLINIC | Age: 30
End: 2020-03-20
Payer: COMMERCIAL

## 2020-03-20 VITALS
WEIGHT: 315 LBS | BODY MASS INDEX: 38.36 KG/M2 | TEMPERATURE: 98 F | SYSTOLIC BLOOD PRESSURE: 149 MMHG | DIASTOLIC BLOOD PRESSURE: 98 MMHG | HEART RATE: 87 BPM | HEIGHT: 76 IN

## 2020-03-20 DIAGNOSIS — I10 ESSENTIAL HYPERTENSION: Primary | ICD-10-CM

## 2020-03-20 DIAGNOSIS — K21.9 GASTROESOPHAGEAL REFLUX DISEASE, ESOPHAGITIS PRESENCE NOT SPECIFIED: ICD-10-CM

## 2020-03-20 PROCEDURE — 99214 OFFICE O/P EST MOD 30 MIN: CPT | Performed by: FAMILY MEDICINE

## 2020-03-20 RX ORDER — OMEPRAZOLE 20 MG/1
20 CAPSULE, DELAYED RELEASE ORAL
Qty: 90 CAPSULE | Refills: 0 | Status: SHIPPED | OUTPATIENT
Start: 2020-03-20 | End: 2020-04-28

## 2020-03-20 RX ORDER — LISINOPRIL 10 MG/1
TABLET ORAL
Qty: 90 TABLET | Refills: 1 | Status: SHIPPED | OUTPATIENT
Start: 2020-03-20 | End: 2021-12-06

## 2020-03-20 NOTE — PROGRESS NOTES
HPI:    Jay Moser is a 34year old male presents to clinic with concerns regarding acid reflux. Patient feels that at least 3 times a week, after he eats he develops a burning sensation in his chest.  Feels that he belches more, and has some nausea. Review of Systems   All other systems reviewed and are negative.       PHYSICAL EXAM:      03/20/20  0940   BP: (!) 149/98   Pulse: 87   Temp: 97.8 °F (36.6 °C)   TempSrc: Oral   Weight: (!) 337 lb 8 oz (153.1 kg)   Height: 6' 4\" (1.93 m)     Physical Ex Gaviscon.  -Referred to GI for possible endoscopy    Responsible party/patient verbalized understanding of information discussed. No barriers to learning observed. Orders This Visit:  No orders of the defined types were placed in this encounter.

## 2020-04-22 ENCOUNTER — PATIENT MESSAGE (OUTPATIENT)
Dept: FAMILY MEDICINE CLINIC | Facility: CLINIC | Age: 30
End: 2020-04-22

## 2020-04-22 NOTE — TELEPHONE ENCOUNTER
I am not really sure how to answer this. If they have specific forms that I need to fill out, sure I will. As far as medical records, I do not have anything to do with that. I think the patient has to request his own medical records.   I will need some mo

## 2020-04-22 NOTE — TELEPHONE ENCOUNTER
From: Aliyah Mai  To:  Ellie Brown DO  Sent: 4/22/2020 10:23 AM CDT  Subject: Non-Urgent Darshan Gray Began I'm going for a new job for [employer] and border [employer] and there asking for medical records and doctor approval I wa

## 2020-04-24 ENCOUNTER — PATIENT MESSAGE (OUTPATIENT)
Dept: FAMILY MEDICINE CLINIC | Facility: CLINIC | Age: 30
End: 2020-04-24

## 2020-04-24 RX ORDER — AMLODIPINE BESYLATE 10 MG/1
TABLET ORAL
Qty: 90 TABLET | Refills: 1 | Status: SHIPPED | OUTPATIENT
Start: 2020-04-24 | End: 2020-12-02

## 2020-04-28 ENCOUNTER — VIRTUAL PHONE E/M (OUTPATIENT)
Dept: GASTROENTEROLOGY | Facility: CLINIC | Age: 30
End: 2020-04-28
Payer: COMMERCIAL

## 2020-04-28 DIAGNOSIS — K21.9 GASTROESOPHAGEAL REFLUX DISEASE WITHOUT ESOPHAGITIS: ICD-10-CM

## 2020-04-28 PROCEDURE — 99212 OFFICE O/P EST SF 10 MIN: CPT | Performed by: INTERNAL MEDICINE

## 2020-04-28 RX ORDER — OMEPRAZOLE 20 MG/1
20 CAPSULE, DELAYED RELEASE ORAL
Qty: 90 CAPSULE | Refills: 2 | Status: SHIPPED | OUTPATIENT
Start: 2020-04-28 | End: 2020-07-27

## 2020-04-28 NOTE — PROGRESS NOTES
Virtual Telephone Check-In    Fran Alberto verbally consents to a Virtual/Telephone Check-In visit on 04/28/20. Patient understands and accepts financial responsibility for any deductible, co-insurance and/or co-pays associated with this service.     Du

## 2020-05-15 ENCOUNTER — TELEPHONE (OUTPATIENT)
Dept: FAMILY MEDICINE CLINIC | Facility: CLINIC | Age: 30
End: 2020-05-15

## 2020-05-15 NOTE — TELEPHONE ENCOUNTER
Patient is requesting an update of forms dropped off 2 weeks ago.  The documents were medical background forms for his employer

## 2020-05-18 NOTE — TELEPHONE ENCOUNTER
Pt sees Dr. Daniel Perkins. She has not seen form. MA has not seen form. Will contact pt to confirm when and where he left the form.

## 2020-05-20 NOTE — TELEPHONE ENCOUNTER
Called patient in regards forms, Patient states he dropped them off at the  about two weeks ago with screener. I told patient if he will be able to drop them off again, misplaced forms and put attention to Adriana. Patient will be dropping off forms tomorrow.

## 2020-06-09 ENCOUNTER — OFFICE VISIT (OUTPATIENT)
Dept: FAMILY MEDICINE CLINIC | Facility: CLINIC | Age: 30
End: 2020-06-09
Payer: COMMERCIAL

## 2020-06-09 ENCOUNTER — LAB ENCOUNTER (OUTPATIENT)
Dept: LAB | Age: 30
End: 2020-06-09
Attending: FAMILY MEDICINE
Payer: COMMERCIAL

## 2020-06-09 VITALS
HEIGHT: 76 IN | TEMPERATURE: 98 F | SYSTOLIC BLOOD PRESSURE: 150 MMHG | HEART RATE: 77 BPM | DIASTOLIC BLOOD PRESSURE: 110 MMHG | BODY MASS INDEX: 38.36 KG/M2 | WEIGHT: 315 LBS

## 2020-06-09 DIAGNOSIS — Z00.00 ANNUAL PHYSICAL EXAM: Primary | ICD-10-CM

## 2020-06-09 DIAGNOSIS — Z00.00 ANNUAL PHYSICAL EXAM: ICD-10-CM

## 2020-06-09 DIAGNOSIS — I10 ESSENTIAL HYPERTENSION: ICD-10-CM

## 2020-06-09 DIAGNOSIS — N18.2 CKD (CHRONIC KIDNEY DISEASE) STAGE 2, GFR 60-89 ML/MIN: ICD-10-CM

## 2020-06-09 PROCEDURE — 80053 COMPREHEN METABOLIC PANEL: CPT

## 2020-06-09 PROCEDURE — 83036 HEMOGLOBIN GLYCOSYLATED A1C: CPT

## 2020-06-09 PROCEDURE — 36415 COLL VENOUS BLD VENIPUNCTURE: CPT

## 2020-06-09 PROCEDURE — 85025 COMPLETE CBC W/AUTO DIFF WBC: CPT

## 2020-06-09 PROCEDURE — 90715 TDAP VACCINE 7 YRS/> IM: CPT | Performed by: FAMILY MEDICINE

## 2020-06-09 PROCEDURE — 90471 IMMUNIZATION ADMIN: CPT | Performed by: FAMILY MEDICINE

## 2020-06-09 PROCEDURE — 84443 ASSAY THYROID STIM HORMONE: CPT

## 2020-06-09 PROCEDURE — 99395 PREV VISIT EST AGE 18-39: CPT | Performed by: FAMILY MEDICINE

## 2020-06-09 PROCEDURE — 80061 LIPID PANEL: CPT

## 2020-06-09 PROCEDURE — 84439 ASSAY OF FREE THYROXINE: CPT

## 2020-06-09 NOTE — PROGRESS NOTES
HPI:    Irasema Chopra is a 34year old male presents to clinic for an annual physical exam.   HTN - chronic issue for patient. Reports compliance with medication. Had gained weight due to lack of exercise.  Patient denies HAs, blurry vision, nausea, vomit Head: Normocephalic and atraumatic.    Right Ear: Tympanic membrane, external ear and ear canal normal.   Left Ear: Tympanic membrane, external ear and ear canal normal.   Nose: Nose normal.   Mouth/Throat: Uvula is midline, oropharynx is clear and moist prescriptions requested or ordered in this encounter       Imaging & Referrals:  TETANUS, DIPHTHERIA TOXOIDS AND ACELLULAR PERTUSIS VACCINE (TDAP), >7 YEARS, IM USE     This note was created by [email or web address] voice recognition.  Errors in content may be related to

## 2020-09-29 ENCOUNTER — PATIENT MESSAGE (OUTPATIENT)
Dept: FAMILY MEDICINE CLINIC | Facility: CLINIC | Age: 30
End: 2020-09-29

## 2020-09-29 NOTE — TELEPHONE ENCOUNTER
From: Michael Coe  To: Melisa Lo MD  Sent: 9/29/2020 1:13 AM CDT  Subject: Other    Louie Sanes Dr. Justin Faria I was wondering if you could help me out. I have FMLA for my job for GERD.  Long story short on sept 12 my job is requesting medical documentation be

## 2020-10-07 ENCOUNTER — TELEMEDICINE (OUTPATIENT)
Dept: FAMILY MEDICINE CLINIC | Facility: CLINIC | Age: 30
End: 2020-10-07

## 2020-10-07 DIAGNOSIS — I10 ESSENTIAL HYPERTENSION: ICD-10-CM

## 2020-10-07 DIAGNOSIS — K21.9 GASTROESOPHAGEAL REFLUX DISEASE, UNSPECIFIED WHETHER ESOPHAGITIS PRESENT: Primary | ICD-10-CM

## 2020-10-07 PROCEDURE — 99214 OFFICE O/P EST MOD 30 MIN: CPT | Performed by: FAMILY MEDICINE

## 2020-10-07 NOTE — PROGRESS NOTES
HPI:    Brandin Hall is a 34year old male presents to clinic for video visit. GERD-reports that symptoms have been getting worse. Stopped taking PPI, because he was experiencing some fatigue and abdominal discomfort with it.   Used to take it whenever Constitutional: No distress. Pulmonary/Chest:   Breathing appears nonlabored, speaking comfortably in full sentences without difficulty   Psychiatric: He has a normal mood and affect.        ASSESSMENT/PLAN:   (K21.9) Gastroesophageal reflux disease, un recognition by the system; efforts to review and correct have been done but errors may still exist. Please contact me with any questions.        10/7/2020  Bill Leavitt MD

## 2020-12-02 ENCOUNTER — PATIENT MESSAGE (OUTPATIENT)
Dept: FAMILY MEDICINE CLINIC | Facility: CLINIC | Age: 30
End: 2020-12-02

## 2020-12-02 RX ORDER — AMLODIPINE BESYLATE 10 MG/1
10 TABLET ORAL DAILY
Qty: 90 TABLET | Refills: 1 | Status: SHIPPED | OUTPATIENT
Start: 2020-12-02 | End: 2021-12-06

## 2020-12-02 NOTE — TELEPHONE ENCOUNTER
Pended    From: Rajni Freeman  To:  Nimco Evangelista MD  Sent: 12/2/2020  8:09 AM CST  Subject: Susy Carson I'm running low on the amlodipine 10 mg and I do not have any refills left

## 2020-12-07 ENCOUNTER — OFFICE VISIT (OUTPATIENT)
Dept: FAMILY MEDICINE CLINIC | Facility: CLINIC | Age: 30
End: 2020-12-07
Payer: COMMERCIAL

## 2020-12-07 ENCOUNTER — TELEPHONE (OUTPATIENT)
Dept: FAMILY MEDICINE CLINIC | Facility: CLINIC | Age: 30
End: 2020-12-07

## 2020-12-07 VITALS
HEIGHT: 76 IN | WEIGHT: 315 LBS | HEART RATE: 103 BPM | BODY MASS INDEX: 38.36 KG/M2 | DIASTOLIC BLOOD PRESSURE: 108 MMHG | SYSTOLIC BLOOD PRESSURE: 161 MMHG | RESPIRATION RATE: 17 BRPM

## 2020-12-07 DIAGNOSIS — R35.0 URINARY FREQUENCY: Primary | ICD-10-CM

## 2020-12-07 DIAGNOSIS — K21.9 GASTROESOPHAGEAL REFLUX DISEASE, UNSPECIFIED WHETHER ESOPHAGITIS PRESENT: ICD-10-CM

## 2020-12-07 DIAGNOSIS — I10 ESSENTIAL HYPERTENSION: ICD-10-CM

## 2020-12-07 DIAGNOSIS — N18.2 CKD (CHRONIC KIDNEY DISEASE) STAGE 2, GFR 60-89 ML/MIN: ICD-10-CM

## 2020-12-07 PROCEDURE — 99214 OFFICE O/P EST MOD 30 MIN: CPT | Performed by: FAMILY MEDICINE

## 2020-12-07 PROCEDURE — 3077F SYST BP >= 140 MM HG: CPT | Performed by: FAMILY MEDICINE

## 2020-12-07 PROCEDURE — 3080F DIAST BP >= 90 MM HG: CPT | Performed by: FAMILY MEDICINE

## 2020-12-07 PROCEDURE — 3008F BODY MASS INDEX DOCD: CPT | Performed by: FAMILY MEDICINE

## 2020-12-07 PROCEDURE — 81003 URINALYSIS AUTO W/O SCOPE: CPT | Performed by: FAMILY MEDICINE

## 2020-12-07 NOTE — TELEPHONE ENCOUNTER
Patient dropped off FMLA forms and signed a new HIPPA, needs the fee of $25 to be billed, faxed all paperwork to forms dept, then placed paperwork in the form box at [employer].       pls contact patient when complete or if any further questions

## 2020-12-07 NOTE — PROGRESS NOTES
HPI:    Daisy Marin is a 34year old male presents to clinic with a 4-day history of urinary frequency. On Sunday, patient reports mild discomfort with urination. Also reports some dull right-sided back pain.   Denies fevers, chills, nausea, vomiting, Constitutional: No distress. HENT:   Head: Normocephalic and atraumatic. Cardiovascular: Normal rate, regular rhythm and normal heart sounds. Pulmonary/Chest: Effort normal and breath sounds normal. No respiratory distress. He has no wheezes.  He ha [E]      Meds This Visit:  Requested Prescriptions      No prescriptions requested or ordered in this encounter       Imaging & Referrals:  FLULAVAL INFLUENZA VACCINE QUAD PRESERVATIVE FREE 0.5 ML       This note was created by [employer] voice recognition.  Er

## 2020-12-16 NOTE — TELEPHONE ENCOUNTER
Dr. Silas Pena    **Pt requesting 1-4 appts per month for recert for FMLA**     Please sign off on form: FMLA   -Highlight the patient and hit \"Chart\" button.   -In Chart Review, w/in the Encounter tab - click 1 time on the Telephone call encounter for 12/7

## 2020-12-16 NOTE — TELEPHONE ENCOUNTER
FMLA forms completed and faxed to [employer] Freeman at [phone], confirmation received. Sent pt [employer] message.

## 2021-02-11 NOTE — PATIENT INSTRUCTIONS
Take losartan in am     take amlodipine in pm     stop lisinopril       Do labs  Blood and urine one month    See me November 15    Keep eye on bp   keep under 140/80    Watch salt and lose weight   Goal 250 lbs     Nice to meet you Coral Mclain Pt continues to exhibit bizarre behaviors, delusional thought content, and has been internally preoccupied, makes non-sensical statements and difficulties with linear conversations. Pt was smearing feces on the walls and on her clothing, also urinated in a cup in front of nurses station last week. This week, pt has not smeared feces on the walls at this time.  Pt continues to exhibit bizarre behaviors, delusional thought content, and has been internally preoccupied, makes non-sensical statements and difficulties with linear conversations. Pt is with improving ADLs and is taking her meds as prescribed.  Pt continues to exhibit bizarre behaviors, delusional thought content, and has been internally preoccupied, but has been in good behavioral control. Pt is with poor ADLs, often defecating on herself and requires prompting to clean herself.  Patient was noted to be agitated on the unit yesterday and required PRN medications. Patient had retention hearing yesterday and will remain on the unit until there is a bed available at [facility]. Patient's mood is labile. Patient is visible on the unit. Patient was screaming and laying herself on the floor last night. Patient accepted PO meds. Pt has been accepted to [facility] for state transfer on 4/7/21. SW completing all requires paperwork and documentation.

## 2021-02-18 ENCOUNTER — WALK IN (OUTPATIENT)
Dept: URGENT CARE | Age: 31
End: 2021-02-18

## 2021-02-18 ENCOUNTER — LAB SERVICES (OUTPATIENT)
Dept: LAB | Age: 31
End: 2021-02-18

## 2021-02-18 ENCOUNTER — IMAGING SERVICES (OUTPATIENT)
Dept: GENERAL RADIOLOGY | Age: 31
End: 2021-02-18
Attending: NURSE PRACTITIONER

## 2021-02-18 VITALS
HEIGHT: 76 IN | OXYGEN SATURATION: 99 % | WEIGHT: 315 LBS | SYSTOLIC BLOOD PRESSURE: 161 MMHG | TEMPERATURE: 98.1 F | DIASTOLIC BLOOD PRESSURE: 114 MMHG | RESPIRATION RATE: 18 BRPM | HEART RATE: 103 BPM | BODY MASS INDEX: 38.36 KG/M2

## 2021-02-18 DIAGNOSIS — M79.674 PAIN IN TOE OF RIGHT FOOT: Primary | ICD-10-CM

## 2021-02-18 DIAGNOSIS — S99.921A INJURY OF TOE ON RIGHT FOOT, INITIAL ENCOUNTER: ICD-10-CM

## 2021-02-18 DIAGNOSIS — M10.9 GOUT OF BIG TOE: ICD-10-CM

## 2021-02-18 DIAGNOSIS — M79.674 PAIN IN TOE OF RIGHT FOOT: ICD-10-CM

## 2021-02-18 PROBLEM — N18.2 CKD (CHRONIC KIDNEY DISEASE) STAGE 2, GFR 60-89 ML/MIN: Status: ACTIVE | Noted: 2019-11-18

## 2021-02-18 PROBLEM — K21.9 GASTROESOPHAGEAL REFLUX DISEASE WITHOUT ESOPHAGITIS: Status: ACTIVE | Noted: 2019-12-26

## 2021-02-18 PROBLEM — I10 ESSENTIAL HYPERTENSION: Status: ACTIVE | Noted: 2019-06-26

## 2021-02-18 PROCEDURE — 73660 X-RAY EXAM OF TOE(S): CPT | Performed by: RADIOLOGY

## 2021-02-18 PROCEDURE — 36415 COLL VENOUS BLD VENIPUNCTURE: CPT | Performed by: NURSE PRACTITIONER

## 2021-02-18 PROCEDURE — 80053 COMPREHEN METABOLIC PANEL: CPT | Performed by: NURSE PRACTITIONER

## 2021-02-18 PROCEDURE — 84550 ASSAY OF BLOOD/URIC ACID: CPT | Performed by: NURSE PRACTITIONER

## 2021-02-18 PROCEDURE — 3080F DIAST BP >= 90 MM HG: CPT | Performed by: NURSE PRACTITIONER

## 2021-02-18 PROCEDURE — 99204 OFFICE O/P NEW MOD 45 MIN: CPT | Performed by: NURSE PRACTITIONER

## 2021-02-18 PROCEDURE — 3077F SYST BP >= 140 MM HG: CPT | Performed by: NURSE PRACTITIONER

## 2021-02-18 RX ORDER — LISINOPRIL 10 MG/1
10 TABLET ORAL DAILY
COMMUNITY
Start: 2020-03-20

## 2021-02-18 RX ORDER — AMLODIPINE BESYLATE 10 MG/1
10 TABLET ORAL DAILY
COMMUNITY
Start: 2020-12-02

## 2021-02-18 RX ORDER — PREDNISONE 20 MG/1
40 TABLET ORAL DAILY
Qty: 6 TABLET | Refills: 0 | Status: SHIPPED | OUTPATIENT
Start: 2021-02-18 | End: 2021-02-21

## 2021-02-19 LAB
ALBUMIN SERPL-MCNC: 3.7 G/DL (ref 3.6–5.1)
ALBUMIN/GLOB SERPL: 1 {RATIO} (ref 1–2.4)
ALP SERPL-CCNC: 95 UNITS/L (ref 45–117)
ALT SERPL-CCNC: 26 UNITS/L
ANION GAP SERPL CALC-SCNC: 9 MMOL/L (ref 10–20)
AST SERPL-CCNC: 17 UNITS/L
BILIRUB SERPL-MCNC: 0.3 MG/DL (ref 0.2–1)
BUN SERPL-MCNC: 12 MG/DL (ref 6–20)
BUN/CREAT SERPL: 7 (ref 7–25)
CALCIUM SERPL-MCNC: 9 MG/DL (ref 8.4–10.2)
CHLORIDE SERPL-SCNC: 105 MMOL/L (ref 98–107)
CO2 SERPL-SCNC: 29 MMOL/L (ref 21–32)
CREAT SERPL-MCNC: 1.74 MG/DL (ref 0.67–1.17)
FASTING DURATION TIME PATIENT: ABNORMAL H
GFR SERPLBLD BASED ON 1.73 SQ M-ARVRAT: 60 ML/MIN/1.73M2
GLOBULIN SER-MCNC: 3.7 G/DL (ref 2–4)
GLUCOSE SERPL-MCNC: 132 MG/DL (ref 65–99)
POTASSIUM SERPL-SCNC: 4 MMOL/L (ref 3.4–5.1)
PROT SERPL-MCNC: 7.4 G/DL (ref 6.4–8.2)
SODIUM SERPL-SCNC: 139 MMOL/L (ref 135–145)
URATE SERPL-MCNC: 8.2 MG/DL (ref 3.5–7.2)

## 2021-04-06 ENCOUNTER — MED REC SCAN ONLY (OUTPATIENT)
Dept: FAMILY MEDICINE CLINIC | Facility: CLINIC | Age: 31
End: 2021-04-06

## 2021-10-23 ENCOUNTER — IMMUNIZATION (OUTPATIENT)
Dept: LAB | Facility: HOSPITAL | Age: 31
End: 2021-10-23
Attending: EMERGENCY MEDICINE
Payer: COMMERCIAL

## 2021-10-23 DIAGNOSIS — Z23 NEED FOR VACCINATION: Primary | ICD-10-CM

## 2021-10-23 PROCEDURE — 0001A SARSCOV2 VAC 30MCG/0.3ML IM: CPT

## 2021-11-26 ENCOUNTER — IMMUNIZATION (OUTPATIENT)
Dept: LAB | Facility: HOSPITAL | Age: 31
End: 2021-11-26
Attending: EMERGENCY MEDICINE
Payer: COMMERCIAL

## 2021-11-26 DIAGNOSIS — Z23 NEED FOR VACCINATION: Primary | ICD-10-CM

## 2021-11-26 PROCEDURE — 0002A SARSCOV2 VAC 30MCG/0.3ML IM: CPT

## 2021-12-06 ENCOUNTER — TELEMEDICINE (OUTPATIENT)
Dept: FAMILY MEDICINE CLINIC | Facility: CLINIC | Age: 31
End: 2021-12-06

## 2021-12-06 DIAGNOSIS — R50.83 FEVER AFTER COVID-19 VACCINATION: Primary | ICD-10-CM

## 2021-12-06 DIAGNOSIS — T50.B95A FEVER AFTER COVID-19 VACCINATION: Primary | ICD-10-CM

## 2021-12-06 DIAGNOSIS — I10 ESSENTIAL HYPERTENSION: ICD-10-CM

## 2021-12-06 PROCEDURE — 99214 OFFICE O/P EST MOD 30 MIN: CPT | Performed by: FAMILY MEDICINE

## 2021-12-06 RX ORDER — LISINOPRIL 10 MG/1
TABLET ORAL
Qty: 90 TABLET | Refills: 1 | Status: SHIPPED | OUTPATIENT
Start: 2021-12-06

## 2021-12-06 RX ORDER — AMLODIPINE BESYLATE 10 MG/1
10 TABLET ORAL DAILY
Qty: 90 TABLET | Refills: 1 | Status: SHIPPED | OUTPATIENT
Start: 2021-12-06

## 2021-12-07 NOTE — PROGRESS NOTES
HPI:    Fran Alberto is a 31year old male presents for video visit with concerns regarding reaction to COVID-19 vaccine. Patient received vaccine about 1 week back-reports a 4-day history of fevers, chills, body aches, and fatigue.   Today he woke up f Comments: Breathing appears nonlabored  Neurological:      Mental Status: He is alert. ASSESSMENT/PLAN:   (R50.83,  T50.B95A) Fever after COVID-19 vaccination  (primary encounter diagnosis)  Plan:   -Symptoms improved.   Patient reassured that this contact me with any questions.        12/6/2021  Marianne Brownlee MD

## 2021-12-28 ENCOUNTER — PATIENT MESSAGE (OUTPATIENT)
Dept: FAMILY MEDICINE CLINIC | Facility: CLINIC | Age: 31
End: 2021-12-28

## 2021-12-28 ENCOUNTER — TELEPHONE (OUTPATIENT)
Dept: FAMILY MEDICINE CLINIC | Facility: CLINIC | Age: 31
End: 2021-12-28

## 2021-12-28 NOTE — TELEPHONE ENCOUNTER
From: Brien Maddox  To:  Yaquelin Chappell MD  Sent: 12/28/2021 7:26 AM CST  Subject: Elbert Reno so I need a doctors note I was exposed to [place] 19 on [address] which I had mild symptoms I rested the last 3 days which I feel better

## 2021-12-28 NOTE — TELEPHONE ENCOUNTER
----- Message from Cesilia Wheeler sent at 12/28/2021  7:26 AM CST -----  Regarding: Flro Gallardo so I need a doctors note I was exposed to [place] 19 on [address] which I had mild symptoms I rested the last 3 days which I feel better n

## 2021-12-29 NOTE — TELEPHONE ENCOUNTER
See [employer] message, Pt has Tele med visit 12/6/21    Unable to reach, letter pending

## 2023-10-09 ENCOUNTER — TELEPHONE (OUTPATIENT)
Dept: FAMILY MEDICINE CLINIC | Facility: CLINIC | Age: 33
End: 2023-10-09

## 2023-10-09 NOTE — TELEPHONE ENCOUNTER
Per Dr. Tanika Cox, patient schedule for video visit for migraine. Patient has history of hypertension and hasn't been seen in office since . Dr. Tanika Cox recommending in person visit. Called patient, confirmed name and . Patient advised of Dr. James Stephen recommendation. Patient states he cannot do in person today. Patient scheduled for Wednesday. Patient states he knows he needs to schedule a physical.  Patient also states he had gotten sick a couple weeks ago and work wanted a doctor's note. Patient states they want it by tomorrow. Patient advised that Dr. Tanika Cox cannot right a note without seeing him and that they discuss specifics at the appointment.

## 2023-10-11 ENCOUNTER — OFFICE VISIT (OUTPATIENT)
Dept: FAMILY MEDICINE CLINIC | Facility: CLINIC | Age: 33
End: 2023-10-11
Payer: COMMERCIAL

## 2023-10-11 ENCOUNTER — HOSPITAL ENCOUNTER (INPATIENT)
Facility: HOSPITAL | Age: 33
LOS: 7 days | Discharge: HOME | DRG: 673 | End: 2023-10-18
Attending: EMERGENCY MEDICINE | Admitting: HOSPITALIST
Payer: COMMERCIAL

## 2023-10-11 ENCOUNTER — APPOINTMENT (OUTPATIENT)
Dept: CT IMAGING | Facility: HOSPITAL | Age: 33
DRG: 673 | End: 2023-10-11
Attending: EMERGENCY MEDICINE
Payer: COMMERCIAL

## 2023-10-11 VITALS
SYSTOLIC BLOOD PRESSURE: 212 MMHG | WEIGHT: 315 LBS | HEIGHT: 76.18 IN | RESPIRATION RATE: 17 BRPM | TEMPERATURE: 98 F | DIASTOLIC BLOOD PRESSURE: 129 MMHG | HEART RATE: 98 BPM | OXYGEN SATURATION: 100 % | BODY MASS INDEX: 38.36 KG/M2

## 2023-10-11 DIAGNOSIS — I10 UNCONTROLLED HYPERTENSION: Primary | ICD-10-CM

## 2023-10-11 DIAGNOSIS — I16.0 HYPERTENSIVE URGENCY: Primary | ICD-10-CM

## 2023-10-11 DIAGNOSIS — R51.9 BILATERAL HEADACHES: ICD-10-CM

## 2023-10-11 LAB
ANION GAP SERPL CALC-SCNC: 8 MMOL/L (ref 0–18)
ATRIAL RATE: 85 BPM
BASOPHILS # BLD AUTO: 0.07 X10(3) UL (ref 0–0.2)
BASOPHILS NFR BLD AUTO: 0.7 %
BUN BLD-MCNC: 45 MG/DL (ref 7–18)
BUN/CREAT SERPL: 6.2 (ref 10–20)
CALCIUM BLD-MCNC: 9.1 MG/DL (ref 8.5–10.1)
CHLORIDE SERPL-SCNC: 107 MMOL/L (ref 98–112)
CHOLEST SERPL-MCNC: 144 MG/DL (ref ?–200)
CO2 SERPL-SCNC: 24 MMOL/L (ref 21–32)
CREAT BLD-MCNC: 7.29 MG/DL
DEPRECATED RDW RBC AUTO: 35.8 FL (ref 35.1–46.3)
EGFRCR SERPLBLD CKD-EPI 2021: 10 ML/MIN/1.73M2 (ref 60–?)
EOSINOPHIL # BLD AUTO: 0.04 X10(3) UL (ref 0–0.7)
EOSINOPHIL NFR BLD AUTO: 0.4 %
ERYTHROCYTE [DISTWIDTH] IN BLOOD BY AUTOMATED COUNT: 12.4 % (ref 11–15)
GLUCOSE BLD-MCNC: 91 MG/DL (ref 70–99)
HCT VFR BLD AUTO: 37.5 %
HDLC SERPL-MCNC: 41 MG/DL (ref 40–59)
HGB BLD-MCNC: 12.1 G/DL
IMM GRANULOCYTES # BLD AUTO: 0.04 X10(3) UL (ref 0–1)
IMM GRANULOCYTES NFR BLD: 0.4 %
LDLC SERPL CALC-MCNC: 84 MG/DL (ref ?–100)
LYMPHOCYTES # BLD AUTO: 2.11 X10(3) UL (ref 1–4)
LYMPHOCYTES NFR BLD AUTO: 22.3 %
MCH RBC QN AUTO: 25.9 PG (ref 26–34)
MCHC RBC AUTO-ENTMCNC: 32.3 G/DL (ref 31–37)
MCV RBC AUTO: 80.3 FL
MONOCYTES # BLD AUTO: 0.5 X10(3) UL (ref 0.1–1)
MONOCYTES NFR BLD AUTO: 5.3 %
NEUTROPHILS # BLD AUTO: 6.7 X10 (3) UL (ref 1.5–7.7)
NEUTROPHILS # BLD AUTO: 6.7 X10(3) UL (ref 1.5–7.7)
NEUTROPHILS NFR BLD AUTO: 70.9 %
NONHDLC SERPL-MCNC: 103 MG/DL (ref ?–130)
OSMOLALITY SERPL CALC.SUM OF ELEC: 299 MOSM/KG (ref 275–295)
P AXIS: 31 DEGREES
P-R INTERVAL: 114 MS
PLATELET # BLD AUTO: 285 10(3)UL (ref 150–450)
POTASSIUM SERPL-SCNC: 4.3 MMOL/L (ref 3.5–5.1)
Q-T INTERVAL: 384 MS
QRS DURATION: 92 MS
QTC CALCULATION (BEZET): 456 MS
R AXIS: -4 DEGREES
RBC # BLD AUTO: 4.67 X10(6)UL
SODIUM SERPL-SCNC: 139 MMOL/L (ref 136–145)
T AXIS: 206 DEGREES
TRIGL SERPL-MCNC: 105 MG/DL (ref 30–149)
TROPONIN I HIGH SENSITIVITY: 110 NG/L
TROPONIN I HIGH SENSITIVITY: 98 NG/L
VENTRICULAR RATE: 85 BPM
VLDLC SERPL CALC-MCNC: 17 MG/DL (ref 0–30)
WBC # BLD AUTO: 9.5 X10(3) UL (ref 4–11)

## 2023-10-11 PROCEDURE — 93000 ELECTROCARDIOGRAM COMPLETE: CPT | Performed by: FAMILY MEDICINE

## 2023-10-11 PROCEDURE — 99223 1ST HOSP IP/OBS HIGH 75: CPT | Performed by: HOSPITALIST

## 2023-10-11 PROCEDURE — 3080F DIAST BP >= 90 MM HG: CPT | Performed by: FAMILY MEDICINE

## 2023-10-11 PROCEDURE — 70450 CT HEAD/BRAIN W/O DYE: CPT | Performed by: EMERGENCY MEDICINE

## 2023-10-11 PROCEDURE — 99214 OFFICE O/P EST MOD 30 MIN: CPT | Performed by: FAMILY MEDICINE

## 2023-10-11 PROCEDURE — 3077F SYST BP >= 140 MM HG: CPT | Performed by: FAMILY MEDICINE

## 2023-10-11 PROCEDURE — 3008F BODY MASS INDEX DOCD: CPT | Performed by: FAMILY MEDICINE

## 2023-10-11 RX ORDER — PROCHLORPERAZINE EDISYLATE 5 MG/ML
5 INJECTION INTRAMUSCULAR; INTRAVENOUS EVERY 8 HOURS PRN
Status: DISCONTINUED | OUTPATIENT
Start: 2023-10-11 | End: 2023-10-18

## 2023-10-11 RX ORDER — NITROGLYCERIN 20 MG/100ML
INJECTION INTRAVENOUS CONTINUOUS
Status: DISCONTINUED | OUTPATIENT
Start: 2023-10-11 | End: 2023-10-13

## 2023-10-11 RX ORDER — ONDANSETRON 2 MG/ML
4 INJECTION INTRAMUSCULAR; INTRAVENOUS EVERY 6 HOURS PRN
Status: DISCONTINUED | OUTPATIENT
Start: 2023-10-11 | End: 2023-10-18

## 2023-10-11 RX ORDER — ASPIRIN 81 MG/1
324 TABLET, CHEWABLE ORAL ONCE
Status: COMPLETED | OUTPATIENT
Start: 2023-10-11 | End: 2023-10-11

## 2023-10-11 RX ORDER — HEPARIN SODIUM 5000 [USP'U]/ML
7500 INJECTION, SOLUTION INTRAVENOUS; SUBCUTANEOUS EVERY 12 HOURS SCHEDULED
Status: DISCONTINUED | OUTPATIENT
Start: 2023-10-12 | End: 2023-10-18

## 2023-10-11 RX ORDER — ONDANSETRON 4 MG/1
TABLET, ORALLY DISINTEGRATING ORAL
Status: ON HOLD | COMMUNITY
Start: 2023-09-29

## 2023-10-11 RX ORDER — NITROGLYCERIN 0.4 MG/1
0.4 TABLET SUBLINGUAL EVERY 5 MIN PRN
Status: DISCONTINUED | OUTPATIENT
Start: 2023-10-11 | End: 2023-10-18

## 2023-10-11 RX ORDER — LABETALOL HYDROCHLORIDE 5 MG/ML
20 INJECTION, SOLUTION INTRAVENOUS ONCE
Status: COMPLETED | OUTPATIENT
Start: 2023-10-11 | End: 2023-10-11

## 2023-10-11 RX ORDER — HYDRALAZINE HYDROCHLORIDE 20 MG/ML
20 INJECTION INTRAMUSCULAR; INTRAVENOUS EVERY 4 HOURS PRN
Status: DISCONTINUED | OUTPATIENT
Start: 2023-10-11 | End: 2023-10-11

## 2023-10-11 RX ORDER — ASPIRIN 81 MG/1
81 TABLET, CHEWABLE ORAL DAILY
Status: DISCONTINUED | OUTPATIENT
Start: 2023-10-12 | End: 2023-10-18

## 2023-10-11 RX ORDER — HYDRALAZINE HYDROCHLORIDE 20 MG/ML
10 INJECTION INTRAMUSCULAR; INTRAVENOUS ONCE
Status: COMPLETED | OUTPATIENT
Start: 2023-10-11 | End: 2023-10-11

## 2023-10-11 NOTE — ED INITIAL ASSESSMENT (HPI)
Pt presents to ed with c/o abnormal EKG and HTN. Pt states he was at his primary doctors office julia Keyes, and was sent to ED for abnormal EKG and HTN. Denies headache, vision changes, dizziness. Numbness and tingling.

## 2023-10-12 ENCOUNTER — APPOINTMENT (OUTPATIENT)
Dept: ULTRASOUND IMAGING | Facility: HOSPITAL | Age: 33
DRG: 673 | End: 2023-10-12
Attending: HOSPITALIST
Payer: COMMERCIAL

## 2023-10-12 PROBLEM — N17.9 ACUTE KIDNEY INJURY SUPERIMPOSED ON CHRONIC KIDNEY DISEASE (HCC): Status: ACTIVE | Noted: 2023-10-12

## 2023-10-12 PROBLEM — N18.9 ACUTE KIDNEY INJURY SUPERIMPOSED ON CHRONIC KIDNEY DISEASE (HCC): Status: ACTIVE | Noted: 2023-10-12

## 2023-10-12 PROBLEM — N18.9 ACUTE KIDNEY INJURY SUPERIMPOSED ON CHRONIC KIDNEY DISEASE  (HCC): Status: ACTIVE | Noted: 2023-10-12

## 2023-10-12 PROBLEM — N17.9 ACUTE KIDNEY INJURY SUPERIMPOSED ON CHRONIC KIDNEY DISEASE: Status: ACTIVE | Noted: 2023-10-12

## 2023-10-12 PROBLEM — N18.9 ACUTE KIDNEY INJURY SUPERIMPOSED ON CHRONIC KIDNEY DISEASE: Status: ACTIVE | Noted: 2023-10-12

## 2023-10-12 PROBLEM — N17.9 ACUTE KIDNEY INJURY SUPERIMPOSED ON CHRONIC KIDNEY DISEASE  (HCC): Status: ACTIVE | Noted: 2023-10-12

## 2023-10-12 LAB
ALBUMIN SERPL-MCNC: 3.3 G/DL (ref 3.4–5)
ALBUMIN/GLOB SERPL: 0.8 {RATIO} (ref 1–2)
ALP LIVER SERPL-CCNC: 76 U/L
ALT SERPL-CCNC: 12 U/L
ANION GAP SERPL CALC-SCNC: 11 MMOL/L (ref 0–18)
AST SERPL-CCNC: 10 U/L (ref 15–37)
BASOPHILS # BLD AUTO: 0.04 X10(3) UL (ref 0–0.2)
BASOPHILS NFR BLD AUTO: 0.4 %
BILIRUB SERPL-MCNC: 0.5 MG/DL (ref 0.1–2)
BILIRUB UR QL: NEGATIVE
BUN BLD-MCNC: 46 MG/DL (ref 7–18)
BUN/CREAT SERPL: 6.4 (ref 10–20)
CALCIUM BLD-MCNC: 8.9 MG/DL (ref 8.5–10.1)
CHLORIDE SERPL-SCNC: 108 MMOL/L (ref 98–112)
CLARITY UR: CLEAR
CO2 SERPL-SCNC: 22 MMOL/L (ref 21–32)
COLOR UR: COLORLESS
CREAT BLD-MCNC: 7.17 MG/DL
CREAT UR-SCNC: 164 MG/DL
CREAT UR-SCNC: 35.9 MG/DL
DEPRECATED HBV CORE AB SER IA-ACNC: 250 NG/ML
DEPRECATED RDW RBC AUTO: 36.4 FL (ref 35.1–46.3)
EGFRCR SERPLBLD CKD-EPI 2021: 10 ML/MIN/1.73M2 (ref 60–?)
EOSINOPHIL # BLD AUTO: 0.05 X10(3) UL (ref 0–0.7)
EOSINOPHIL NFR BLD AUTO: 0.5 %
ERYTHROCYTE [DISTWIDTH] IN BLOOD BY AUTOMATED COUNT: 12.5 % (ref 11–15)
GLOBULIN PLAS-MCNC: 4.3 G/DL (ref 2.8–4.4)
GLUCOSE BLD-MCNC: 95 MG/DL (ref 70–99)
GLUCOSE UR-MCNC: NORMAL MG/DL
HCT VFR BLD AUTO: 37.8 %
HGB BLD-MCNC: 12.2 G/DL
HGB UR QL STRIP.AUTO: NEGATIVE
IMM GRANULOCYTES # BLD AUTO: 0.03 X10(3) UL (ref 0–1)
IMM GRANULOCYTES NFR BLD: 0.3 %
IRON SATN MFR SERPL: 21 %
IRON SERPL-MCNC: 60 UG/DL
KETONES UR-MCNC: NEGATIVE MG/DL
LEUKOCYTE ESTERASE UR QL STRIP.AUTO: NEGATIVE
LYMPHOCYTES # BLD AUTO: 2.32 X10(3) UL (ref 1–4)
LYMPHOCYTES NFR BLD AUTO: 24.6 %
MAGNESIUM SERPL-MCNC: 2.1 MG/DL (ref 1.6–2.6)
MAGNESIUM SERPL-MCNC: 2.1 MG/DL (ref 1.6–2.6)
MCH RBC QN AUTO: 26 PG (ref 26–34)
MCHC RBC AUTO-ENTMCNC: 32.3 G/DL (ref 31–37)
MCV RBC AUTO: 80.6 FL
MICROALBUMIN UR-MCNC: 586 MG/DL
MICROALBUMIN/CREAT 24H UR-RTO: 3573.2 UG/MG (ref ?–30)
MONOCYTES # BLD AUTO: 0.49 X10(3) UL (ref 0.1–1)
MONOCYTES NFR BLD AUTO: 5.2 %
MRSA DNA SPEC QL NAA+PROBE: NEGATIVE
MRSA DNA SPEC QL NAA+PROBE: NEGATIVE
NEUTROPHILS # BLD AUTO: 6.51 X10 (3) UL (ref 1.5–7.7)
NEUTROPHILS # BLD AUTO: 6.51 X10(3) UL (ref 1.5–7.7)
NEUTROPHILS NFR BLD AUTO: 69 %
NITRITE UR QL STRIP.AUTO: NEGATIVE
OSMOLALITY SERPL CALC.SUM OF ELEC: 304 MOSM/KG (ref 275–295)
PH UR: 6.5 [PH] (ref 5–8)
PHOSPHATE SERPL-MCNC: 4.3 MG/DL (ref 2.5–4.9)
PLATELET # BLD AUTO: 284 10(3)UL (ref 150–450)
POTASSIUM SERPL-SCNC: 3.8 MMOL/L (ref 3.5–5.1)
PROT SERPL-MCNC: 7.6 G/DL (ref 6.4–8.2)
PROT UR-MCNC: 300 MG/DL
RBC # BLD AUTO: 4.69 X10(6)UL
SARS-COV-2 RNA RESP QL NAA+PROBE: NOT DETECTED
SARS-COV-2 RNA RESP QL NAA+PROBE: NOT DETECTED
SODIUM SERPL-SCNC: 141 MMOL/L (ref 136–145)
SODIUM SERPL-SCNC: 79 MMOL/L
SP GR UR STRIP: 1.01 (ref 1–1.03)
TIBC SERPL-MCNC: 289 UG/DL (ref 240–450)
TRANSFERRIN SERPL-MCNC: 194 MG/DL (ref 200–360)
TROPONIN I HIGH SENSITIVITY: 115 NG/L
TROPONIN I HIGH SENSITIVITY: 115 NG/L
UROBILINOGEN UR STRIP-ACNC: NORMAL
VIT B12 SERPL-MCNC: 644 PG/ML (ref 193–986)
WBC # BLD AUTO: 9.4 X10(3) UL (ref 4–11)

## 2023-10-12 PROCEDURE — 76770 US EXAM ABDO BACK WALL COMP: CPT | Performed by: HOSPITALIST

## 2023-10-12 PROCEDURE — 99222 1ST HOSP IP/OBS MODERATE 55: CPT | Performed by: INTERNAL MEDICINE

## 2023-10-12 PROCEDURE — 99233 SBSQ HOSP IP/OBS HIGH 50: CPT | Performed by: HOSPITALIST

## 2023-10-12 RX ORDER — CARVEDILOL 3.12 MG/1
6.25 TABLET ORAL 2 TIMES DAILY WITH MEALS
Status: DISCONTINUED | OUTPATIENT
Start: 2023-10-12 | End: 2023-10-13

## 2023-10-12 RX ORDER — NIFEDIPINE 30 MG/1
30 TABLET, EXTENDED RELEASE ORAL ONCE
Status: COMPLETED | OUTPATIENT
Start: 2023-10-12 | End: 2023-10-12

## 2023-10-12 RX ORDER — NIFEDIPINE 90 MG/1
90 TABLET, EXTENDED RELEASE ORAL DAILY
Status: DISCONTINUED | OUTPATIENT
Start: 2023-10-13 | End: 2023-10-13

## 2023-10-12 RX ORDER — NIFEDIPINE 60 MG/1
60 TABLET, EXTENDED RELEASE ORAL DAILY
Status: DISCONTINUED | OUTPATIENT
Start: 2023-10-12 | End: 2023-10-12

## 2023-10-12 RX ORDER — LABETALOL HYDROCHLORIDE 5 MG/ML
10 INJECTION, SOLUTION INTRAVENOUS ONCE
Status: DISCONTINUED | OUTPATIENT
Start: 2023-10-12 | End: 2023-10-12

## 2023-10-12 NOTE — H&P
[facility] Duncan    PATIENT'S NAME: Andrzej Keen   ATTENDING PHYSICIAN: Yousif Peter MD   PATIENT ACCOUNT#:   [de-identified]    LOCATION:  [address] [place] [address] RECORD #:   [id number]       YOB: 1990  ADMISSION DATE:       10/11/2023    HISTORY AND PHYSICAL EXAMINATION    DATE OF EXAMINATION:  10/11/2023    CHIEF COMPLAINT:  \"My blood pressure was high at the doctor's office. \"    HISTORY OF PRESENT ILLNESS:  The patient reports that he was exposed to [place] on September 21. Since that time he has on and off had headaches and blurred vision. He denied any chest pain or shortness of breath. He noticed that he has been urinating more at night, every 2 to 3 hours, for the last 2 months also noticed that there were  increased bubbles in the toilet when he urinated. No dysuria. No fever or chills. No nausea or vomiting. He has a history of hypertension and is on lisinopril and amlodipine for that, but he stopped taking those about 2 months ago because of side effects related to the medications. He did not discuss this with his primary care physician before discontinuing. Because of the headaches he was having in September, he made an appointment with his primary care provider and saw her on the day of admission. He was also complaining of occasional episodes of dizziness. He was found to have a blood pressure of 212/129 in the office and was advised to present to the emergency room for further evaluation. He continued to have elevated blood pressure in the ER with a diastolic as high as 172. He was given IV labetalol, hydralazine, and a nitroglycerin drip without significant improvement in his blood pressure and was subsequently started on an IV Nipride drip. CT scan of the brain was performed because of the patient's complaint of headaches. There was no acute intracranial process seen. Nonspecific right greater than left mastoid effusions were seen.   Glucose was 91, sodium 139, potassium 4.3, chloride 107, CO2 of 24, BUN of 45 with a creatinine of 7.29. Patient's creatinine a year ago was 1.74. Calcium was 9.1. GFR was 10. Anion gap was 8. Magnesium was 2.1. Phosphorus was 4.3. Initial troponin was 98, second troponin was 110, third troponin was 115. White count was 9.5 with a hemoglobin of 12.1 and a platelet count of 905,961. Urinalysis is pending. COVID testing was negative. Patient was admitted to the PCU for further evaluation and monitoring. Cardiology and renal consultations were obtained through the emergency room. PAST MEDICAL HISTORY:  Significant for hypertension; chronic kidney disease stage 3; obstructive sleep apnea, patient states he has had 2 machines which have been recalled and has not gotten a third one. He also reports a sports injury about 20 years ago, apparently dislocating his hip and had to have a screw placed on the right side that has remained in place and currently he has no discomfort associated with that hip. MEDICATIONS:  Prior to admission the patient was taking none. ALLERGIES:  No known drug allergies. FAMILY HISTORY:  His mother is in her 46s and has hypertension. His father is in his [de-identified] and has only arthritis. He has a brother with hypertension as well. SOCIAL HISTORY:  The patient smokes about a cigar every other month, drinks alcohol socially 1 time a week or less, does not use drugs. 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 REVIEW OF SYSTEMS:  Twelve systems were reviewed. Patient reports he had a sore throat in September, but it is gone currently. He denies any change in appetite. No weight gain or loss. No nausea, vomiting, diarrhea, melena, or hematochezia. No dysuria. Did complain of frequent nocturia. There are otherwise no additional pertinent positives or negatives on the 12-point review of systems except as listed in the History of Present Illness. PHYSICAL EXAMINATION:    VITAL SIGNS:  Blood pressure when I saw the patient was 162/107, pulse 98, respiratory rate 17, O2 saturation 100% on room air, temperature was 97.8. HEENT:  Normocephalic, atraumatic. Pupils equal, reactive. Sclerae anicteric. There was no sinus tenderness. Mucous membranes were moist.  Oropharynx was crowded. NECK:  Supple. LUNGS:  Somewhat distant breath sounds, but easy respirations. No wheezes or rhonchi. HEART:  Regular rate and rhythm. Normal S1, S2 and an S4. ABDOMEN:  Obese, soft, nontender, with normoactive bowel sounds. No guarding. No rebound. EXTREMITIES:  No clubbing, cyanosis, calf tenderness, palpable cords, or edema. SKIN:  Warm and dry without any significant rashes. NEUROLOGIC:  The patient was awake, alert, oriented x3 with no focal motor or sensory deficits. PSYCHIATRIC:  His mood and affect were pleasant and cooperative. BACK:  There was no costovertebral angle tenderness. LABORATORY DATA:  Previously mentioned. ASSESSMENT AND PLAN:    1. Hypertensive urgency. Patient failed IV hydralazine, labetalol, and nitroglycerin drip. He is currently on Nipride. Because of his marked blood pressure elevation, do not want to correct his blood pressure too quickly. Recommend systolic no less than 413 or diastolic of about 850. Could ultimately benefit with a beta blocker given his troponin elevation, although this could be on the basis of his renal insufficiency. Will await further recommendations per Cardiology and Nephrology. 2.   Elevated troponins. There has been no significant increase in the troponin. I suspect the elevations are more on the basis of his renal failure than any acute ischemic event. EKG revealed nonspecific T-wave abnormality only. Await Cardiology evaluation and recommendations. Will obtain echocardiogram.  3.   Acute renal failure. GFR is 10.   Patient has underlying chronic kidney disease stage 3, but this has significantly worsened. Will obtain renal ultrasound, UA, urine sodium/creatinine and await further evaluation and recommendations by Nephrology. Hopefully, there will be some improvement in his renal function with improvement in his blood pressure. 4.   Obstructive sleep apnea, could certainly be contributing to the patient's refractory hypertension. Discussed obtaining CPAP machine as soon as possible. CATARINO protocol ordered while the patient is hospitalized. 5.   Mild anemia of uncertain etiology, may be related to renal insufficiency. Will check stool for occult blood, iron, B12 levels. 6.   DVT prophylaxis. SCDs, subcutaneous heparin. 7.   The patient's current clinical status and proposed treatment plan were discussed with him. All of questions were answered and he agreed with the plan of care as outlined above.     Dictated By Dominic Hunter MD  d: 10/12/2023 05:51:17  t: 10/12/2023 24:34:19  Job [id number]  [facility]/

## 2023-10-12 NOTE — PLAN OF CARE
Notified by RN that patient is hypertensive with BP 180s/130s after getting oral meds and stopping cardene gtt. Chart reviewed, BP consistently increasing.      Plan:   -Give one time dose of nifedipine 30mg, daily dose increased to 90mg starting tomorrow  -Restart nicardipine gtt-use as needed to keep SBP <160.  -Start carvedilol 6.25mg BID MARISELA Ray  10/12/23  1:59 PM  [phone] [place]  [phone] Agustin Adorno

## 2023-10-12 NOTE — CDS QUERY
BMI 41.17    Dear Doctor: Joe Chery showing Height 6'4\" and Weight 338LB   BMI being calculated as 41.17  Admitted for Hypertensive Crisis   Risk Factors: HTN, CKD Stage 3   Diet as inpatient- Renal Diet     PLEASE (X) ALL DIAGNOSES THAT APPLY TO A   BMI OF 41.17  SELECTION BY PROVIDER ONLY      ( x)  Morbid Obesity due to Excess Calories    ( )  Other (please specify)         If you have any questions, please contact Clinical :  Soila Urbano RN at [phone]     Thank You!     THIS FORM IS A PERMANENT PART OF THE MEDICAL RECORD

## 2023-10-12 NOTE — PLAN OF CARE
Prior to start of transfusion, Pt found to have temp of 100 F oral, Dr. Joy Montero made aware of situation and okay to continue with blood transfusion.      Jacqui Simon RN  06/30/22 0878 Pt admitted for uncontrolled blood pressure. Started on nitroglycerin gtt and transitioning to cardene gtt. Titrating per policy. Pt states he stopped taking bp meds >8 months. Denies headache, chest pain, dizziness. Reviewed on patient's condition and plan of care. Educated on importance of med compliance, bp management, ,and kidney health. Problem: Patient Centered Care  Goal: Patient preferences are identified and integrated in the patient's plan of care  Description: Interventions:  - What would you like us to know as we care for you? Pt's symptom has started for 2weeks ago  - Provide timely, complete, and accurate information to patient/family  - Incorporate patient and family knowledge, values, beliefs, and cultural backgrounds into the planning and delivery of care  - Encourage patient/family to participate in care and decision-making at the level they choose  - Honor patient and family perspectives and choices  Outcome: Progressing     Problem: CARDIOVASCULAR - ADULT  Goal: Maintains optimal cardiac output and hemodynamic stability  Description: INTERVENTIONS:  - Monitor vital signs, rhythm, and trends  - Monitor for bleeding, hypotension and signs of decreased cardiac output  - Evaluate effectiveness of vasoactive medications to optimize hemodynamic stability  - Monitor arterial and/or venous puncture sites for bleeding and/or hematoma  - Assess quality of pulses, skin color and temperature  - Assess for signs of decreased coronary artery perfusion - ex.  Angina  - Evaluate fluid balance, assess for edema, trend weights  Outcome: Progressing  Goal: Absence of cardiac arrhythmias or at baseline  Description: INTERVENTIONS:  - Continuous cardiac monitoring, monitor vital signs, obtain 12 lead EKG if indicated  - Evaluate effectiveness of antiarrhythmic and heart rate control medications as ordered  - Initiate emergency measures for life threatening arrhythmias  - Monitor electrolytes and administer replacement therapy as ordered  Outcome: Progressing     Problem: Patient/Family Goals  Goal: Patient/Family Long Term Goal  Description: Patient's Long Term Goal: better health management    Interventions:  - med compliance, education  - See additional Care Plan goals for specific interventions  Outcome: Not Progressing  Goal: Patient/Family Short Term Goal  Description: Patient's Short Term Goal: lower blood pressure    Interventions:   - frequent check, med management  - See additional Care Plan goals for specific interventions  Outcome: Not Progressing

## 2023-10-12 NOTE — PLAN OF CARE
Giovanna Pearson Patient Status:  Emergency    1990 MRN [id number]   Location [address] Attending Jordan Melara MD   Hosp Day # 0 PCP No primary care provider on file. Cardiology Nocturnal APN Note    Page Received: Dr Yoon Mendoza @     HPI:     Patient is a 29year old male with PMH of HTN. Pt was on antihypertensive amlodipine and lisinopril but has not taken it for several months. Pt was sent to ED from PCP office for elevated blood pressure. Pt's SBP was 200's. Pt asymptomatic. Pt was given Labetalol and Hydralazine in ED without improvement and was started on nitroglycerine gtt. Trop x2 elevated, creatinine of 7.29. Without any complaints of chest pain. [facility] was consulted for uncontrolled hypertension. HPI obtained from chart review and information provided by ER provider. EKG: normal sinus rhythm without acute ST elevation. Vital Signs:       10/11/2023    10:00 PM 10/11/2023    10:05 PM   Vitals History   /144 203/156   Pulse 96 101   Resp 19 19   SpO2 100 % 99 %        Labs:   Lab Results   Component Value Date    WBC 9.5 10/11/2023    HGB 12.1 10/11/2023    HCT 37.5 10/11/2023    .0 10/11/2023    CREATSERUM 7.29 10/11/2023    BUN 45 10/11/2023     10/11/2023    K 4.3 10/11/2023     10/11/2023    CO2 24.0 10/11/2023    GLU 91 10/11/2023    CA 9.1 10/11/2023    TROPHS 110 10/11/2023       Diagnostics:   CT BRAIN OR HEAD ([id number])    Result Date: 10/11/2023  CONCLUSION:  1. No acute intracranial process by noncontrast CT technique. 2. Nonspecific right greater than left mastoid effusions. 3. Partially imaged mild-to-moderate adenoid enlargement. 4. Lesser incidental findings as above.    Dictated by (CST): Nicol Sharp MD on 10/11/2023 at 9:59 PM     Finalized by (CST): Nicol Sharp MD on 10/11/2023 at 10:01 PM           Allergies:  No Known Allergies    Medications:      nitroGLYCERIN in dextrose 5% Assessment/Plan:  - echo in am  - trend trop  - nephrologist consult  - continue titrate nitroglycerine gtt  - PRN Hydralazine  - Continue to monitor overnight  - Formal Cardiology consult to follow in AM.     Jaye La Sal 07, [address]  10/11/2023  10:10 PM

## 2023-10-12 NOTE — PROGRESS NOTES
ADMISSION NOTE    29year old male with h/o HTN and CKD III  GFR 60 last year presents with 2 weeks of headaches, and hypertensive urgency. Labs revealed acute renal failure and elevated troponin. Available medical records partially reviewed. Dictation to follow.     Rosaura Lai M.D.  10/11/2023

## 2023-10-13 ENCOUNTER — APPOINTMENT (OUTPATIENT)
Dept: CV DIAGNOSTICS | Facility: HOSPITAL | Age: 33
DRG: 673 | End: 2023-10-13
Attending: HOSPITALIST
Payer: COMMERCIAL

## 2023-10-13 LAB
ANION GAP SERPL CALC-SCNC: 8 MMOL/L (ref 0–18)
BASOPHILS # BLD AUTO: 0.08 X10(3) UL (ref 0–0.2)
BASOPHILS NFR BLD AUTO: 0.9 %
BUN BLD-MCNC: 47 MG/DL (ref 7–18)
BUN/CREAT SERPL: 6.3 (ref 10–20)
CALCIUM BLD-MCNC: 8.9 MG/DL (ref 8.5–10.1)
CHLORIDE SERPL-SCNC: 107 MMOL/L (ref 98–112)
CO2 SERPL-SCNC: 23 MMOL/L (ref 21–32)
CREAT BLD-MCNC: 7.49 MG/DL
DEPRECATED RDW RBC AUTO: 35.6 FL (ref 35.1–46.3)
EGFRCR SERPLBLD CKD-EPI 2021: 9 ML/MIN/1.73M2 (ref 60–?)
EOSINOPHIL # BLD AUTO: 0.11 X10(3) UL (ref 0–0.7)
EOSINOPHIL NFR BLD AUTO: 1.2 %
ERYTHROCYTE [DISTWIDTH] IN BLOOD BY AUTOMATED COUNT: 12.3 % (ref 11–15)
GLUCOSE BLD-MCNC: 93 MG/DL (ref 70–99)
HCT VFR BLD AUTO: 37.6 %
HGB BLD-MCNC: 12.2 G/DL
IMM GRANULOCYTES # BLD AUTO: 0.03 X10(3) UL (ref 0–1)
IMM GRANULOCYTES NFR BLD: 0.3 %
LYMPHOCYTES # BLD AUTO: 2.55 X10(3) UL (ref 1–4)
LYMPHOCYTES NFR BLD AUTO: 27.3 %
MAGNESIUM SERPL-MCNC: 2.1 MG/DL (ref 1.6–2.6)
MCH RBC QN AUTO: 25.7 PG (ref 26–34)
MCHC RBC AUTO-ENTMCNC: 32.4 G/DL (ref 31–37)
MCV RBC AUTO: 79.3 FL
MONOCYTES # BLD AUTO: 0.48 X10(3) UL (ref 0.1–1)
MONOCYTES NFR BLD AUTO: 5.1 %
NEUTROPHILS # BLD AUTO: 6.1 X10 (3) UL (ref 1.5–7.7)
NEUTROPHILS # BLD AUTO: 6.1 X10(3) UL (ref 1.5–7.7)
NEUTROPHILS NFR BLD AUTO: 65.2 %
OSMOLALITY SERPL CALC.SUM OF ELEC: 298 MOSM/KG (ref 275–295)
PHOSPHATE SERPL-MCNC: 4.4 MG/DL (ref 2.5–4.9)
PLATELET # BLD AUTO: 299 10(3)UL (ref 150–450)
POTASSIUM SERPL-SCNC: 4.1 MMOL/L (ref 3.5–5.1)
PTH-INTACT SERPL-MCNC: 465.2 PG/ML (ref 18.5–88)
RBC # BLD AUTO: 4.74 X10(6)UL
SODIUM SERPL-SCNC: 138 MMOL/L (ref 136–145)
VIT D+METAB SERPL-MCNC: 5.9 NG/ML (ref 30–100)
WBC # BLD AUTO: 9.4 X10(3) UL (ref 4–11)

## 2023-10-13 PROCEDURE — 99232 SBSQ HOSP IP/OBS MODERATE 35: CPT | Performed by: INTERNAL MEDICINE

## 2023-10-13 PROCEDURE — 99233 SBSQ HOSP IP/OBS HIGH 50: CPT | Performed by: HOSPITALIST

## 2023-10-13 PROCEDURE — 93306 TTE W/DOPPLER COMPLETE: CPT | Performed by: HOSPITALIST

## 2023-10-13 RX ORDER — NIFEDIPINE 60 MG/1
60 TABLET, EXTENDED RELEASE ORAL DAILY
Status: DISCONTINUED | OUTPATIENT
Start: 2023-10-14 | End: 2023-10-13

## 2023-10-13 RX ORDER — CARVEDILOL 6.25 MG/1
6.25 TABLET ORAL 2 TIMES DAILY WITH MEALS
Qty: 60 TABLET | Refills: 1 | Status: SHIPPED | OUTPATIENT
Start: 2023-10-13

## 2023-10-13 RX ORDER — ASPIRIN 81 MG/1
81 TABLET, CHEWABLE ORAL DAILY
Status: SHIPPED | COMMUNITY
Start: 2023-10-14

## 2023-10-13 RX ORDER — CARVEDILOL 12.5 MG/1
12.5 TABLET ORAL 2 TIMES DAILY WITH MEALS
Status: DISCONTINUED | OUTPATIENT
Start: 2023-10-13 | End: 2023-10-13

## 2023-10-13 RX ORDER — CARVEDILOL 6.25 MG/1
6.25 TABLET ORAL 2 TIMES DAILY WITH MEALS
Status: DISCONTINUED | OUTPATIENT
Start: 2023-10-13 | End: 2023-10-18

## 2023-10-13 RX ORDER — NIFEDIPINE 90 MG/1
90 TABLET, EXTENDED RELEASE ORAL DAILY
Status: DISCONTINUED | OUTPATIENT
Start: 2023-10-14 | End: 2023-10-17

## 2023-10-13 RX ORDER — NIFEDIPINE 60 MG/1
60 TABLET, EXTENDED RELEASE ORAL DAILY
Status: DISCONTINUED | OUTPATIENT
Start: 2023-10-13 | End: 2023-10-13

## 2023-10-13 RX ORDER — NIFEDIPINE 30 MG/1
30 TABLET, EXTENDED RELEASE ORAL DAILY
Status: DISCONTINUED | OUTPATIENT
Start: 2023-10-14 | End: 2023-10-13

## 2023-10-13 RX ORDER — ERGOCALCIFEROL 1.25 MG/1
50000 CAPSULE ORAL
Status: DISCONTINUED | OUTPATIENT
Start: 2023-10-13 | End: 2023-10-18

## 2023-10-13 RX ORDER — NIFEDIPINE 90 MG/1
90 TABLET, FILM COATED, EXTENDED RELEASE ORAL DAILY
Qty: 30 TABLET | Refills: 1 | Status: SHIPPED | OUTPATIENT
Start: 2023-10-14

## 2023-10-13 RX ORDER — CALCITRIOL 0.25 UG/1
0.25 CAPSULE, LIQUID FILLED ORAL DAILY
Status: DISCONTINUED | OUTPATIENT
Start: 2023-10-13 | End: 2023-10-18

## 2023-10-13 RX ORDER — NIFEDIPINE 90 MG/1
90 TABLET, EXTENDED RELEASE ORAL DAILY
Status: DISCONTINUED | OUTPATIENT
Start: 2023-10-13 | End: 2023-10-13

## 2023-10-13 NOTE — PLAN OF CARE
Problem: CARDIOVASCULAR - ADULT  Goal: Maintains optimal cardiac output and hemodynamic stability  Description: INTERVENTIONS:  - Monitor vital signs, rhythm, and trends  - Monitor for bleeding, hypotension and signs of decreased cardiac output  - Evaluate effectiveness of vasoactive medications to optimize hemodynamic stability  - Monitor arterial and/or venous puncture sites for bleeding and/or hematoma  - Assess quality of pulses, skin color and temperature  - Assess for signs of decreased coronary artery perfusion - ex.  Angina  - Evaluate fluid balance, assess for edema, trend weights  10/13/2023 0813 by Simin Gallo RN  Outcome: Not Progressing  10/13/2023 0813 by Simin Gallo RN  Outcome: Progressing  Goal: Absence of cardiac arrhythmias or at baseline  Description: INTERVENTIONS:  - Continuous cardiac monitoring, monitor vital signs, obtain 12 lead EKG if indicated  - Evaluate effectiveness of antiarrhythmic and heart rate control medications as ordered  - Initiate emergency measures for life threatening arrhythmias  - Monitor electrolytes and administer replacement therapy as ordered  10/13/2023 0813 by Simin Gallo RN  Outcome: Progressing  10/13/2023 0813 by Simin Gallo RN  Outcome: Progressing     Problem: METABOLIC/FLUID AND ELECTROLYTES - ADULT  Goal: Electrolytes maintained within normal limits  Description: INTERVENTIONS:  - Monitor labs and rhythm and assess patient for signs and symptoms of electrolyte imbalances  - Administer electrolyte replacement as ordered  - Monitor response to electrolyte replacements, including rhythm and repeat lab results as appropriate  - Fluid restriction as ordered  - Instruct patient on fluid and nutrition restrictions as appropriate  Outcome: Not Progressing

## 2023-10-13 NOTE — PLAN OF CARE
Problem: Patient Centered Care  Goal: Patient preferences are identified and integrated in the patient's plan of care  Description: Interventions:  - What would you like us to know as we care for you?   - Provide timely, complete, and accurate information to patient/family  - Incorporate patient and family knowledge, values, beliefs, and cultural backgrounds into the planning and delivery of care  - Encourage patient/family to participate in care and decision-making at the level they choose  - Honor patient and family perspectives and choices  Outcome: Progressing     Problem: Patient/Family Goals  Goal: Patient/Family Long Term Goal  Description: Patient's Long Term Goal:     Interventions:  -   - See additional Care Plan goals for specific interventions  Outcome: Progressing  Goal: Patient/Family Short Term Goal  Description: Patient's Short Term Goal:     Interventions:   -  - See additional Care Plan goals for specific interventions  Outcome: Progressing     Problem: CARDIOVASCULAR - ADULT  Goal: Maintains optimal cardiac output and hemodynamic stability  Description: INTERVENTIONS:  - Monitor vital signs, rhythm, and trends  - Monitor for bleeding, hypotension and signs of decreased cardiac output  - Evaluate effectiveness of vasoactive medications to optimize hemodynamic stability  - Monitor arterial and/or venous puncture sites for bleeding and/or hematoma  - Assess quality of pulses, skin color and temperature  - Assess for signs of decreased coronary artery perfusion - ex.  Angina  - Evaluate fluid balance, assess for edema, trend weights  Outcome: Progressing  Goal: Absence of cardiac arrhythmias or at baseline  Description: INTERVENTIONS:  - Continuous cardiac monitoring, monitor vital signs, obtain 12 lead EKG if indicated  - Evaluate effectiveness of antiarrhythmic and heart rate control medications as ordered  - Initiate emergency measures for life threatening arrhythmias  - Monitor electrolytes and administer replacement therapy as ordered  Outcome: Progressing

## 2023-10-13 NOTE — PLAN OF CARE
Off all drips. OK transfer to tele. Renal to discharge possibly on Sunday, watching his creatinine levels. Problem: Patient Centered Care  Goal: Patient preferences are identified and integrated in the patient's plan of care  Description: Interventions:  - What would you like us to know as we care for you?   - Provide timely, complete, and accurate information to patient/family  - Incorporate patient and family knowledge, values, beliefs, and cultural backgrounds into the planning and delivery of care  - Encourage patient/family to participate in care and decision-making at the level they choose  - Honor patient and family perspectives and choices  Outcome: Progressing     Problem: Patient/Family Goals  Goal: Patient/Family Long Term Goal  Description: Patient's Long Term Goal:     Interventions:  -   - See additional Care Plan goals for specific interventions  Outcome: Progressing  Goal: Patient/Family Short Term Goal  Description: Patient's Short Term Goal:     Interventions:   -   - See additional Care Plan goals for specific interventions  Outcome: Progressing     Problem: CARDIOVASCULAR - ADULT  Goal: Maintains optimal cardiac output and hemodynamic stability  Description: INTERVENTIONS:  - Monitor vital signs, rhythm, and trends  - Monitor for bleeding, hypotension and signs of decreased cardiac output  - Evaluate effectiveness of vasoactive medications to optimize hemodynamic stability  - Monitor arterial and/or venous puncture sites for bleeding and/or hematoma  - Assess quality of pulses, skin color and temperature  - Assess for signs of decreased coronary artery perfusion - ex.  Angina  - Evaluate fluid balance, assess for edema, trend weights  Outcome: Progressing  Goal: Absence of cardiac arrhythmias or at baseline  Description: INTERVENTIONS:  - Continuous cardiac monitoring, monitor vital signs, obtain 12 lead EKG if indicated  - Evaluate effectiveness of antiarrhythmic and heart rate control medications as ordered  - Initiate emergency measures for life threatening arrhythmias  - Monitor electrolytes and administer replacement therapy as ordered  Outcome: Progressing     Problem: METABOLIC/FLUID AND ELECTROLYTES - ADULT  Goal: Electrolytes maintained within normal limits  Description: INTERVENTIONS:  - Monitor labs and rhythm and assess patient for signs and symptoms of electrolyte imbalances  - Administer electrolyte replacement as ordered  - Monitor response to electrolyte replacements, including rhythm and repeat lab results as appropriate  - Fluid restriction as ordered  - Instruct patient on fluid and nutrition restrictions as appropriate  Outcome: Progressing

## 2023-10-14 PROBLEM — D63.1 ANEMIA DUE TO STAGE 5 CHRONIC KIDNEY DISEASE, NOT ON CHRONIC DIALYSIS: Status: ACTIVE | Noted: 2023-10-14

## 2023-10-14 PROBLEM — N17.9 AKI (ACUTE KIDNEY INJURY): Status: ACTIVE | Noted: 2023-10-14

## 2023-10-14 PROBLEM — N17.9 AKI (ACUTE KIDNEY INJURY) (HCC): Status: ACTIVE | Noted: 2023-10-14

## 2023-10-14 PROBLEM — N18.5 ANEMIA DUE TO STAGE 5 CHRONIC KIDNEY DISEASE, NOT ON CHRONIC DIALYSIS  (HCC): Status: ACTIVE | Noted: 2023-10-14

## 2023-10-14 PROBLEM — D63.1 ANEMIA DUE TO STAGE 5 CHRONIC KIDNEY DISEASE, NOT ON CHRONIC DIALYSIS (HCC): Status: ACTIVE | Noted: 2023-10-14

## 2023-10-14 PROBLEM — D63.1 ANEMIA DUE TO STAGE 5 CHRONIC KIDNEY DISEASE, NOT ON CHRONIC DIALYSIS  (HCC): Status: ACTIVE | Noted: 2023-10-14

## 2023-10-14 PROBLEM — N18.5 CHRONIC KIDNEY DISEASE (CKD), STAGE V (HCC): Status: ACTIVE | Noted: 2023-10-14

## 2023-10-14 PROBLEM — N18.5 ANEMIA DUE TO STAGE 5 CHRONIC KIDNEY DISEASE, NOT ON CHRONIC DIALYSIS: Status: ACTIVE | Noted: 2023-10-14

## 2023-10-14 PROBLEM — N18.5 ANEMIA DUE TO STAGE 5 CHRONIC KIDNEY DISEASE, NOT ON CHRONIC DIALYSIS (HCC): Status: ACTIVE | Noted: 2023-10-14

## 2023-10-14 PROBLEM — N25.81 SECONDARY HYPERPARATHYROIDISM (HCC): Status: ACTIVE | Noted: 2023-10-14

## 2023-10-14 LAB
ANION GAP SERPL CALC-SCNC: 10 MMOL/L (ref 0–18)
BASOPHILS # BLD AUTO: 0.07 X10(3) UL (ref 0–0.2)
BASOPHILS NFR BLD AUTO: 0.6 %
BUN BLD-MCNC: 54 MG/DL (ref 7–18)
BUN/CREAT SERPL: 6.4 (ref 10–20)
C3 SERPL-MCNC: 135 MG/DL (ref 90–180)
C4 SERPL-MCNC: 50.6 MG/DL (ref 10–40)
CALCIUM BLD-MCNC: 8.6 MG/DL (ref 8.5–10.1)
CHLORIDE SERPL-SCNC: 104 MMOL/L (ref 98–112)
CO2 SERPL-SCNC: 23 MMOL/L (ref 21–32)
CREAT BLD-MCNC: 8.43 MG/DL
DEPRECATED RDW RBC AUTO: 35.1 FL (ref 35.1–46.3)
EGFRCR SERPLBLD CKD-EPI 2021: 8 ML/MIN/1.73M2 (ref 60–?)
EOSINOPHIL # BLD AUTO: 0.13 X10(3) UL (ref 0–0.7)
EOSINOPHIL NFR BLD AUTO: 1.2 %
ERYTHROCYTE [DISTWIDTH] IN BLOOD BY AUTOMATED COUNT: 12.1 % (ref 11–15)
GLUCOSE BLD-MCNC: 97 MG/DL (ref 70–99)
HCT VFR BLD AUTO: 38.5 %
HGB BLD-MCNC: 12.5 G/DL
IMM GRANULOCYTES # BLD AUTO: 0.03 X10(3) UL (ref 0–1)
IMM GRANULOCYTES NFR BLD: 0.3 %
IRON SATN MFR SERPL: 21 %
IRON SERPL-MCNC: 62 UG/DL
LYMPHOCYTES # BLD AUTO: 2.88 X10(3) UL (ref 1–4)
LYMPHOCYTES NFR BLD AUTO: 26.5 %
MAGNESIUM SERPL-MCNC: 2.3 MG/DL (ref 1.6–2.6)
MCH RBC QN AUTO: 26 PG (ref 26–34)
MCHC RBC AUTO-ENTMCNC: 32.5 G/DL (ref 31–37)
MCV RBC AUTO: 80 FL
MONOCYTES # BLD AUTO: 0.52 X10(3) UL (ref 0.1–1)
MONOCYTES NFR BLD AUTO: 4.8 %
NEUTROPHILS # BLD AUTO: 7.25 X10 (3) UL (ref 1.5–7.7)
NEUTROPHILS # BLD AUTO: 7.25 X10(3) UL (ref 1.5–7.7)
NEUTROPHILS NFR BLD AUTO: 66.6 %
OSMOLALITY SERPL CALC.SUM OF ELEC: 299 MOSM/KG (ref 275–295)
PHOSPHATE SERPL-MCNC: 4.9 MG/DL (ref 2.5–4.9)
PLATELET # BLD AUTO: 292 10(3)UL (ref 150–450)
POTASSIUM SERPL-SCNC: 4.3 MMOL/L (ref 3.5–5.1)
RBC # BLD AUTO: 4.81 X10(6)UL
SODIUM SERPL-SCNC: 137 MMOL/L (ref 136–145)
TIBC SERPL-MCNC: 297 UG/DL (ref 240–450)
TRANSFERRIN SERPL-MCNC: 199 MG/DL (ref 200–360)
WBC # BLD AUTO: 10.9 X10(3) UL (ref 4–11)

## 2023-10-14 PROCEDURE — 99233 SBSQ HOSP IP/OBS HIGH 50: CPT | Performed by: INTERNAL MEDICINE

## 2023-10-14 PROCEDURE — 99233 SBSQ HOSP IP/OBS HIGH 50: CPT | Performed by: HOSPITALIST

## 2023-10-14 NOTE — PLAN OF CARE
Up ad haseeb, discussions of HD/Transplant ongoing. Maintained patient safety. Problem: Patient Centered Care  Goal: Patient preferences are identified and integrated in the patient's plan of care  Description: Interventions:  - What would you like us to know as we care for you?   - Provide timely, complete, and accurate information to patient/family  - Incorporate patient and family knowledge, values, beliefs, and cultural backgrounds into the planning and delivery of care  - Encourage patient/family to participate in care and decision-making at the level they choose  - Honor patient and family perspectives and choices  Outcome: Progressing     Problem: Patient/Family Goals  Goal: Patient/Family Long Term Goal  Description: Patient's Long Term Goal:     Interventions:  -   - See additional Care Plan goals for specific interventions  Outcome: Progressing  Goal: Patient/Family Short Term Goal  Description: Patient's Short Term Goal:     Interventions:   -   - See additional Care Plan goals for specific interventions  Outcome: Progressing     Problem: CARDIOVASCULAR - ADULT  Goal: Maintains optimal cardiac output and hemodynamic stability  Description: INTERVENTIONS:  - Monitor vital signs, rhythm, and trends  - Monitor for bleeding, hypotension and signs of decreased cardiac output  - Evaluate effectiveness of vasoactive medications to optimize hemodynamic stability  - Monitor arterial and/or venous puncture sites for bleeding and/or hematoma  - Assess quality of pulses, skin color and temperature  - Assess for signs of decreased coronary artery perfusion - ex.  Angina  - Evaluate fluid balance, assess for edema, trend weights  Outcome: Progressing  Goal: Absence of cardiac arrhythmias or at baseline  Description: INTERVENTIONS:  - Continuous cardiac monitoring, monitor vital signs, obtain 12 lead EKG if indicated  - Evaluate effectiveness of antiarrhythmic and heart rate control medications as ordered  - Initiate emergency measures for life threatening arrhythmias  - Monitor electrolytes and administer replacement therapy as ordered  Outcome: Progressing     Problem: METABOLIC/FLUID AND ELECTROLYTES - ADULT  Goal: Electrolytes maintained within normal limits  Description: INTERVENTIONS:  - Monitor labs and rhythm and assess patient for signs and symptoms of electrolyte imbalances  - Administer electrolyte replacement as ordered  - Monitor response to electrolyte replacements, including rhythm and repeat lab results as appropriate  - Fluid restriction as ordered  - Instruct patient on fluid and nutrition restrictions as appropriate  Outcome: Progressing

## 2023-10-14 NOTE — PLAN OF CARE
Patient remains alert and oriented. VSS throughout shift. Room air. Patient with no complains of chest pain or SOB. Patient educated on fall precautions, call light within reach. Problem: Patient Centered Care  Goal: Patient preferences are identified and integrated in the patient's plan of care  Description: Interventions:  - What would you like us to know as we care for you?   - Provide timely, complete, and accurate information to patient/family  - Incorporate patient and family knowledge, values, beliefs, and cultural backgrounds into the planning and delivery of care  - Encourage patient/family to participate in care and decision-making at the level they choose  - Honor patient and family perspectives and choices  Outcome: Progressing     Problem: Patient/Family Goals  Goal: Patient/Family Long Term Goal  Description: Patient's Long Term Goal:     Interventions:  -   - See additional Care Plan goals for specific interventions  Outcome: Progressing  Goal: Patient/Family Short Term Goal  Description: Patient's Short Term Goal:     Interventions:   -   - See additional Care Plan goals for specific interventions  Outcome: Progressing     Problem: CARDIOVASCULAR - ADULT  Goal: Maintains optimal cardiac output and hemodynamic stability  Description: INTERVENTIONS:  - Monitor vital signs, rhythm, and trends  - Monitor for bleeding, hypotension and signs of decreased cardiac output  - Evaluate effectiveness of vasoactive medications to optimize hemodynamic stability  - Monitor arterial and/or venous puncture sites for bleeding and/or hematoma  - Assess quality of pulses, skin color and temperature  - Assess for signs of decreased coronary artery perfusion - ex.  Angina  - Evaluate fluid balance, assess for edema, trend weights  Outcome: Progressing  Goal: Absence of cardiac arrhythmias or at baseline  Description: INTERVENTIONS:  - Continuous cardiac monitoring, monitor vital signs, obtain 12 lead EKG if indicated  - Evaluate effectiveness of antiarrhythmic and heart rate control medications as ordered  - Initiate emergency measures for life threatening arrhythmias  - Monitor electrolytes and administer replacement therapy as ordered  Outcome: Progressing     Problem: METABOLIC/FLUID AND ELECTROLYTES - ADULT  Goal: Electrolytes maintained within normal limits  Description: INTERVENTIONS:  - Monitor labs and rhythm and assess patient for signs and symptoms of electrolyte imbalances  - Administer electrolyte replacement as ordered  - Monitor response to electrolyte replacements, including rhythm and repeat lab results as appropriate  - Fluid restriction as ordered  - Instruct patient on fluid and nutrition restrictions as appropriate  Outcome: Progressing

## 2023-10-15 PROBLEM — N18.6 ESRD (END STAGE RENAL DISEASE) (HCC): Status: ACTIVE | Noted: 2023-10-15

## 2023-10-15 LAB
ANION GAP SERPL CALC-SCNC: 11 MMOL/L (ref 0–18)
BASOPHILS # BLD AUTO: 0.07 X10(3) UL (ref 0–0.2)
BASOPHILS NFR BLD AUTO: 0.8 %
BUN BLD-MCNC: 62 MG/DL (ref 7–18)
BUN/CREAT SERPL: 6.9 (ref 10–20)
CALCIUM BLD-MCNC: 8.8 MG/DL (ref 8.5–10.1)
CHLORIDE SERPL-SCNC: 103 MMOL/L (ref 98–112)
CO2 SERPL-SCNC: 21 MMOL/L (ref 21–32)
CREAT BLD-MCNC: 8.93 MG/DL
DEPRECATED RDW RBC AUTO: 34.7 FL (ref 35.1–46.3)
EGFRCR SERPLBLD CKD-EPI 2021: 8 ML/MIN/1.73M2 (ref 60–?)
EOSINOPHIL # BLD AUTO: 0.08 X10(3) UL (ref 0–0.7)
EOSINOPHIL NFR BLD AUTO: 0.9 %
ERYTHROCYTE [DISTWIDTH] IN BLOOD BY AUTOMATED COUNT: 12.1 % (ref 11–15)
GLUCOSE BLD-MCNC: 92 MG/DL (ref 70–99)
HCT VFR BLD AUTO: 37.4 %
HGB BLD-MCNC: 12.2 G/DL
IMM GRANULOCYTES # BLD AUTO: 0.03 X10(3) UL (ref 0–1)
IMM GRANULOCYTES NFR BLD: 0.3 %
LYMPHOCYTES # BLD AUTO: 2.97 X10(3) UL (ref 1–4)
LYMPHOCYTES NFR BLD AUTO: 32 %
MAGNESIUM SERPL-MCNC: 2.3 MG/DL (ref 1.6–2.6)
MCH RBC QN AUTO: 25.7 PG (ref 26–34)
MCHC RBC AUTO-ENTMCNC: 32.6 G/DL (ref 31–37)
MCV RBC AUTO: 78.7 FL
MONOCYTES # BLD AUTO: 0.55 X10(3) UL (ref 0.1–1)
MONOCYTES NFR BLD AUTO: 5.9 %
NEUTROPHILS # BLD AUTO: 5.58 X10 (3) UL (ref 1.5–7.7)
NEUTROPHILS # BLD AUTO: 5.58 X10(3) UL (ref 1.5–7.7)
NEUTROPHILS NFR BLD AUTO: 60.1 %
OSMOLALITY SERPL CALC.SUM OF ELEC: 297 MOSM/KG (ref 275–295)
PHOSPHATE SERPL-MCNC: 6 MG/DL (ref 2.5–4.9)
PLATELET # BLD AUTO: 294 10(3)UL (ref 150–450)
POTASSIUM SERPL-SCNC: 4.3 MMOL/L (ref 3.5–5.1)
RBC # BLD AUTO: 4.75 X10(6)UL
SODIUM SERPL-SCNC: 135 MMOL/L (ref 136–145)
WBC # BLD AUTO: 9.3 X10(3) UL (ref 4–11)

## 2023-10-15 PROCEDURE — 99233 SBSQ HOSP IP/OBS HIGH 50: CPT | Performed by: INTERNAL MEDICINE

## 2023-10-15 PROCEDURE — 99233 SBSQ HOSP IP/OBS HIGH 50: CPT | Performed by: HOSPITALIST

## 2023-10-15 RX ORDER — ALBUMIN (HUMAN) 12.5 G/50ML
100 SOLUTION INTRAVENOUS AS NEEDED
Status: DISCONTINUED | OUTPATIENT
Start: 2023-10-15 | End: 2023-10-18

## 2023-10-15 RX ORDER — HEPARIN SODIUM 1000 [USP'U]/ML
1.5 INJECTION, SOLUTION INTRAVENOUS; SUBCUTANEOUS ONCE
Status: DISCONTINUED | OUTPATIENT
Start: 2023-10-15 | End: 2023-10-18

## 2023-10-15 RX ORDER — SEVELAMER CARBONATE 800 MG/1
800 TABLET, FILM COATED ORAL
Status: DISCONTINUED | OUTPATIENT
Start: 2023-10-15 | End: 2023-10-18

## 2023-10-15 NOTE — PLAN OF CARE
Patient A/Ox4. Room air. Ambulates without difficulty. Family at bedside updated on plan of care. Call light within reach. Problem: Patient Centered Care  Goal: Patient preferences are identified and integrated in the patient's plan of care  Description: Interventions:  - What would you like us to know as we care for you?   - Provide timely, complete, and accurate information to patient/family  - Incorporate patient and family knowledge, values, beliefs, and cultural backgrounds into the planning and delivery of care  - Encourage patient/family to participate in care and decision-making at the level they choose  - Honor patient and family perspectives and choices  Outcome: Progressing     Problem: Patient/Family Goals  Goal: Patient/Family Long Term Goal  Description: Patient's Long Term Goal:     Interventions:  -   - See additional Care Plan goals for specific interventions  Outcome: Progressing  Goal: Patient/Family Short Term Goal  Description: Patient's Short Term Goal:     Interventions:   -   - See additional Care Plan goals for specific interventions  Outcome: Progressing     Problem: CARDIOVASCULAR - ADULT  Goal: Maintains optimal cardiac output and hemodynamic stability  Description: INTERVENTIONS:  - Monitor vital signs, rhythm, and trends  - Monitor for bleeding, hypotension and signs of decreased cardiac output  - Evaluate effectiveness of vasoactive medications to optimize hemodynamic stability  - Monitor arterial and/or venous puncture sites for bleeding and/or hematoma  - Assess quality of pulses, skin color and temperature  - Assess for signs of decreased coronary artery perfusion - ex.  Angina  - Evaluate fluid balance, assess for edema, trend weights  Outcome: Progressing  Goal: Absence of cardiac arrhythmias or at baseline  Description: INTERVENTIONS:  - Continuous cardiac monitoring, monitor vital signs, obtain 12 lead EKG if indicated  - Evaluate effectiveness of antiarrhythmic and heart rate control medications as ordered  - Initiate emergency measures for life threatening arrhythmias  - Monitor electrolytes and administer replacement therapy as ordered  Outcome: Progressing     Problem: METABOLIC/FLUID AND ELECTROLYTES - ADULT  Goal: Electrolytes maintained within normal limits  Description: INTERVENTIONS:  - Monitor labs and rhythm and assess patient for signs and symptoms of electrolyte imbalances  - Administer electrolyte replacement as ordered  - Monitor response to electrolyte replacements, including rhythm and repeat lab results as appropriate  - Fluid restriction as ordered  - Instruct patient on fluid and nutrition restrictions as appropriate  Outcome: Progressing

## 2023-10-15 NOTE — PLAN OF CARE
Plan for HD cath placement tomorrow, with dialysis to follow same day. Maintained patient safety. Problem: Patient Centered Care  Goal: Patient preferences are identified and integrated in the patient's plan of care  Description: Interventions:  - What would you like us to know as we care for you?   - Provide timely, complete, and accurate information to patient/family  - Incorporate patient and family knowledge, values, beliefs, and cultural backgrounds into the planning and delivery of care  - Encourage patient/family to participate in care and decision-making at the level they choose  - Honor patient and family perspectives and choices  Outcome: Progressing     Problem: Patient/Family Goals  Goal: Patient/Family Long Term Goal  Description: Patient's Long Term Goal:     Interventions:  -   - See additional Care Plan goals for specific interventions  Outcome: Progressing  Goal: Patient/Family Short Term Goal  Description: Patient's Short Term Goal:     Interventions:   -   - See additional Care Plan goals for specific interventions  Outcome: Progressing     Problem: CARDIOVASCULAR - ADULT  Goal: Maintains optimal cardiac output and hemodynamic stability  Description: INTERVENTIONS:  - Monitor vital signs, rhythm, and trends  - Monitor for bleeding, hypotension and signs of decreased cardiac output  - Evaluate effectiveness of vasoactive medications to optimize hemodynamic stability  - Monitor arterial and/or venous puncture sites for bleeding and/or hematoma  - Assess quality of pulses, skin color and temperature  - Assess for signs of decreased coronary artery perfusion - ex.  Angina  - Evaluate fluid balance, assess for edema, trend weights  Outcome: Progressing  Goal: Absence of cardiac arrhythmias or at baseline  Description: INTERVENTIONS:  - Continuous cardiac monitoring, monitor vital signs, obtain 12 lead EKG if indicated  - Evaluate effectiveness of antiarrhythmic and heart rate control medications as ordered  - Initiate emergency measures for life threatening arrhythmias  - Monitor electrolytes and administer replacement therapy as ordered  Outcome: Progressing     Problem: METABOLIC/FLUID AND ELECTROLYTES - ADULT  Goal: Electrolytes maintained within normal limits  Description: INTERVENTIONS:  - Monitor labs and rhythm and assess patient for signs and symptoms of electrolyte imbalances  - Administer electrolyte replacement as ordered  - Monitor response to electrolyte replacements, including rhythm and repeat lab results as appropriate  - Fluid restriction as ordered  - Instruct patient on fluid and nutrition restrictions as appropriate  Outcome: Progressing

## 2023-10-16 ENCOUNTER — APPOINTMENT (OUTPATIENT)
Dept: INTERVENTIONAL RADIOLOGY/VASCULAR | Facility: HOSPITAL | Age: 33
DRG: 673 | End: 2023-10-16
Attending: INTERNAL MEDICINE
Payer: COMMERCIAL

## 2023-10-16 LAB
ANION GAP SERPL CALC-SCNC: 12 MMOL/L (ref 0–18)
ANTI-PLA2R: <1.8 RU/ML
BASOPHILS # BLD AUTO: 0.06 X10(3) UL (ref 0–0.2)
BASOPHILS NFR BLD AUTO: 0.7 %
BUN BLD-MCNC: 70 MG/DL (ref 7–18)
BUN/CREAT SERPL: 7.6 (ref 10–20)
CALCIUM BLD-MCNC: 8.9 MG/DL (ref 8.5–10.1)
CHLORIDE SERPL-SCNC: 103 MMOL/L (ref 98–112)
CO2 SERPL-SCNC: 21 MMOL/L (ref 21–32)
CREAT BLD-MCNC: 9.17 MG/DL
DEPRECATED RDW RBC AUTO: 35.3 FL (ref 35.1–46.3)
DSDNA IGG SERPL IA-ACNC: 18.4 IU/ML
EGFRCR SERPLBLD CKD-EPI 2021: 7 ML/MIN/1.73M2 (ref 60–?)
ENA AB SER QL IA: 0.4 UG/L
ENA AB SER QL IA: POSITIVE
EOSINOPHIL # BLD AUTO: 0.1 X10(3) UL (ref 0–0.7)
EOSINOPHIL NFR BLD AUTO: 1.1 %
ERYTHROCYTE [DISTWIDTH] IN BLOOD BY AUTOMATED COUNT: 12.1 % (ref 11–15)
GLUCOSE BLD-MCNC: 85 MG/DL (ref 70–99)
HBV CORE AB SERPL QL IA: NONREACTIVE
HBV SURFACE AB SER QL: NONREACTIVE
HBV SURFACE AB SERPL IA-ACNC: <3.1 MIU/ML
HBV SURFACE AG SER-ACNC: <0.1 [IU]/L
HBV SURFACE AG SERPL QL IA: NONREACTIVE
HCT VFR BLD AUTO: 38.4 %
HGB BLD-MCNC: 12.2 G/DL
IMM GRANULOCYTES # BLD AUTO: 0.02 X10(3) UL (ref 0–1)
IMM GRANULOCYTES NFR BLD: 0.2 %
LYMPHOCYTES # BLD AUTO: 2.98 X10(3) UL (ref 1–4)
LYMPHOCYTES NFR BLD AUTO: 32.5 %
MAGNESIUM SERPL-MCNC: 2.4 MG/DL (ref 1.6–2.6)
MCH RBC QN AUTO: 25.5 PG (ref 26–34)
MCHC RBC AUTO-ENTMCNC: 31.8 G/DL (ref 31–37)
MCV RBC AUTO: 80.3 FL
MONOCYTES # BLD AUTO: 0.54 X10(3) UL (ref 0.1–1)
MONOCYTES NFR BLD AUTO: 5.9 %
NEUTROPHILS # BLD AUTO: 5.48 X10 (3) UL (ref 1.5–7.7)
NEUTROPHILS # BLD AUTO: 5.48 X10(3) UL (ref 1.5–7.7)
NEUTROPHILS NFR BLD AUTO: 59.6 %
OSMOLALITY SERPL CALC.SUM OF ELEC: 302 MOSM/KG (ref 275–295)
PHOSPHATE SERPL-MCNC: 6.3 MG/DL (ref 2.5–4.9)
PLATELET # BLD AUTO: 318 10(3)UL (ref 150–450)
POTASSIUM SERPL-SCNC: 4.4 MMOL/L (ref 3.5–5.1)
RBC # BLD AUTO: 4.78 X10(6)UL
SODIUM SERPL-SCNC: 136 MMOL/L (ref 136–145)
WBC # BLD AUTO: 9.2 X10(3) UL (ref 4–11)

## 2023-10-16 PROCEDURE — 0JH63XZ INSERTION OF TUNNELED VASCULAR ACCESS DEVICE INTO CHEST SUBCUTANEOUS TISSUE AND FASCIA, PERCUTANEOUS APPROACH: ICD-10-PCS | Performed by: RADIOLOGY

## 2023-10-16 PROCEDURE — 02HV33Z INSERTION OF INFUSION DEVICE INTO SUPERIOR VENA CAVA, PERCUTANEOUS APPROACH: ICD-10-PCS | Performed by: RADIOLOGY

## 2023-10-16 PROCEDURE — 99233 SBSQ HOSP IP/OBS HIGH 50: CPT | Performed by: HOSPITALIST

## 2023-10-16 PROCEDURE — 99233 SBSQ HOSP IP/OBS HIGH 50: CPT | Performed by: INTERNAL MEDICINE

## 2023-10-16 PROCEDURE — 5A1D70Z PERFORMANCE OF URINARY FILTRATION, INTERMITTENT, LESS THAN 6 HOURS PER DAY: ICD-10-PCS | Performed by: INTERNAL MEDICINE

## 2023-10-16 RX ORDER — HEPARIN SODIUM 1000 [USP'U]/ML
INJECTION, SOLUTION INTRAVENOUS; SUBCUTANEOUS
Status: COMPLETED
Start: 2023-10-16 | End: 2023-10-16

## 2023-10-16 RX ORDER — MIDAZOLAM HYDROCHLORIDE 1 MG/ML
INJECTION INTRAMUSCULAR; INTRAVENOUS
Status: COMPLETED
Start: 2023-10-16 | End: 2023-10-16

## 2023-10-16 RX ORDER — CEFAZOLIN SODIUM/WATER 2 G/20 ML
SYRINGE (ML) INTRAVENOUS
Status: COMPLETED
Start: 2023-10-16 | End: 2023-10-16

## 2023-10-16 RX ORDER — LIDOCAINE HYDROCHLORIDE 20 MG/ML
INJECTION, SOLUTION INFILTRATION; PERINEURAL
Status: COMPLETED
Start: 2023-10-16 | End: 2023-10-16

## 2023-10-16 NOTE — CM/SW NOTE
Patient will need OP Hemodialysis. Referral started earlier for [facility] in [place]. CM uploaded required documents. [facility] will be working on clinical and financial acceptance. CM will continue to follow and assist with dc planning needs. / to remain available for support and/or discharge planning.       Lonzie Sandhoff MBA BSN RN CRRN   RN Case Manager  [phone]

## 2023-10-16 NOTE — PLAN OF CARE
Problem: Patient Centered Care  Goal: Patient preferences are identified and integrated in the patient's plan of care  Description: Interventions:  - What would you like us to know as we care for you?   - Provide timely, complete, and accurate information to patient/family  - Incorporate patient and family knowledge, values, beliefs, and cultural backgrounds into the planning and delivery of care  - Encourage patient/family to participate in care and decision-making at the level they choose  - Honor patient and family perspectives and choices  Outcome: Progressing     Problem: Patient/Family Goals  Goal: Patient/Family Long Term Goal  Description: Patient's Long Term Goal:     Interventions:  -   - See additional Care Plan goals for specific interventions  Outcome: Progressing  Goal: Patient/Family Short Term Goal  Description: Patient's Short Term Goal:     Interventions:   -   - See additional Care Plan goals for specific interventions  Outcome: Progressing     Problem: CARDIOVASCULAR - ADULT  Goal: Maintains optimal cardiac output and hemodynamic stability  Description: INTERVENTIONS:  - Monitor vital signs, rhythm, and trends  - Monitor for bleeding, hypotension and signs of decreased cardiac output  - Evaluate effectiveness of vasoactive medications to optimize hemodynamic stability  - Monitor arterial and/or venous puncture sites for bleeding and/or hematoma  - Assess quality of pulses, skin color and temperature  - Assess for signs of decreased coronary artery perfusion - ex.  Angina  - Evaluate fluid balance, assess for edema, trend weights  Outcome: Progressing  Goal: Absence of cardiac arrhythmias or at baseline  Description: INTERVENTIONS:  - Continuous cardiac monitoring, monitor vital signs, obtain 12 lead EKG if indicated  - Evaluate effectiveness of antiarrhythmic and heart rate control medications as ordered  - Initiate emergency measures for life threatening arrhythmias  - Monitor electrolytes and administer replacement therapy as ordered  Outcome: Progressing     Problem: METABOLIC/FLUID AND ELECTROLYTES - ADULT  Goal: Electrolytes maintained within normal limits  Description: INTERVENTIONS:  - Monitor labs and rhythm and assess patient for signs and symptoms of electrolyte imbalances  - Administer electrolyte replacement as ordered  - Monitor response to electrolyte replacements, including rhythm and repeat lab results as appropriate  - Fluid restriction as ordered  - Instruct patient on fluid and nutrition restrictions as appropriate  10/16/2023 1834 by Gerard Vazquez RN  Outcome: Not Progressing  10/16/2023 1833 by Gerard Vazquez RN  Outcome: Progressing   Patient got dialysis catheter placement today, dialysis later today

## 2023-10-16 NOTE — BRIEF PROCEDURE NOTE
[facility] - [facility]  Procedure Note    Melda Alu Patient Status:  Inpatient    1990 MRN [id number]   Location [facility] Attending Julian Plunkett MD   Hosp Day # 5 PCP Jonathan Grier MD     Procedure:  Tunneled right internal jugular permacath    Pre-Procedure Diagnosis: End-stage renal disease    Post-Procedure Diagnosis: Same    Anesthesia:  Sedation    Findings: 23 cm tunneled dual-lumen permacath placed via right IJ, tip at SVC/RA    Specimens: 0    Blood Loss: Minimal      Complications:  None        Cirilo Houston MD  10/16/2023

## 2023-10-17 LAB
ANION GAP SERPL CALC-SCNC: 11 MMOL/L (ref 0–18)
ANTI-GBM ANTIBODIES: <0.2 UNITS
BASOPHILS # BLD AUTO: 0.05 X10(3) UL (ref 0–0.2)
BASOPHILS NFR BLD AUTO: 0.5 %
BUN BLD-MCNC: 63 MG/DL (ref 7–18)
BUN/CREAT SERPL: 7.6 (ref 10–20)
CALCIUM BLD-MCNC: 8.7 MG/DL (ref 8.5–10.1)
CHLORIDE SERPL-SCNC: 106 MMOL/L (ref 98–112)
CO2 SERPL-SCNC: 22 MMOL/L (ref 21–32)
CREAT BLD-MCNC: 8.29 MG/DL
DEPRECATED RDW RBC AUTO: 35.5 FL (ref 35.1–46.3)
EGFRCR SERPLBLD CKD-EPI 2021: 8 ML/MIN/1.73M2 (ref 60–?)
EOSINOPHIL # BLD AUTO: 0.06 X10(3) UL (ref 0–0.7)
EOSINOPHIL NFR BLD AUTO: 0.6 %
ERYTHROCYTE [DISTWIDTH] IN BLOOD BY AUTOMATED COUNT: 12.3 % (ref 11–15)
GLUCOSE BLD-MCNC: 91 MG/DL (ref 70–99)
HCT VFR BLD AUTO: 36.4 %
HGB BLD-MCNC: 11.7 G/DL
IMM GRANULOCYTES # BLD AUTO: 0.04 X10(3) UL (ref 0–1)
IMM GRANULOCYTES NFR BLD: 0.4 %
LYMPHOCYTES # BLD AUTO: 2.43 X10(3) UL (ref 1–4)
LYMPHOCYTES NFR BLD AUTO: 22.8 %
MAGNESIUM SERPL-MCNC: 2.5 MG/DL (ref 1.6–2.6)
MCH RBC QN AUTO: 25.5 PG (ref 26–34)
MCHC RBC AUTO-ENTMCNC: 32.1 G/DL (ref 31–37)
MCV RBC AUTO: 79.5 FL
MONOCYTES # BLD AUTO: 0.58 X10(3) UL (ref 0.1–1)
MONOCYTES NFR BLD AUTO: 5.4 %
NEUTROPHILS # BLD AUTO: 7.52 X10 (3) UL (ref 1.5–7.7)
NEUTROPHILS # BLD AUTO: 7.52 X10(3) UL (ref 1.5–7.7)
NEUTROPHILS NFR BLD AUTO: 70.3 %
OSMOLALITY SERPL CALC.SUM OF ELEC: 306 MOSM/KG (ref 275–295)
PHOSPHATE SERPL-MCNC: 5.8 MG/DL (ref 2.5–4.9)
PLATELET # BLD AUTO: 277 10(3)UL (ref 150–450)
POTASSIUM SERPL-SCNC: 4.8 MMOL/L (ref 3.5–5.1)
RBC # BLD AUTO: 4.58 X10(6)UL
SODIUM SERPL-SCNC: 139 MMOL/L (ref 136–145)
WBC # BLD AUTO: 10.7 X10(3) UL (ref 4–11)

## 2023-10-17 PROCEDURE — 99233 SBSQ HOSP IP/OBS HIGH 50: CPT | Performed by: INTERNAL MEDICINE

## 2023-10-17 PROCEDURE — 99254 IP/OBS CNSLTJ NEW/EST MOD 60: CPT | Performed by: INTERNAL MEDICINE

## 2023-10-17 PROCEDURE — 99233 SBSQ HOSP IP/OBS HIGH 50: CPT | Performed by: HOSPITALIST

## 2023-10-17 RX ORDER — HEPARIN SODIUM 1000 [USP'U]/ML
INJECTION, SOLUTION INTRAVENOUS; SUBCUTANEOUS
Status: COMPLETED
Start: 2023-10-17 | End: 2023-10-17

## 2023-10-17 RX ORDER — HEPARIN SODIUM 1000 [USP'U]/ML
1500 INJECTION, SOLUTION INTRAVENOUS; SUBCUTANEOUS ONCE
Status: DISCONTINUED | OUTPATIENT
Start: 2023-10-17 | End: 2023-10-17

## 2023-10-17 RX ORDER — NIFEDIPINE 60 MG/1
60 TABLET, EXTENDED RELEASE ORAL DAILY
Status: DISCONTINUED | OUTPATIENT
Start: 2023-10-18 | End: 2023-10-18

## 2023-10-17 RX ORDER — HEPARIN SODIUM 1000 [USP'U]/ML
1500 INJECTION, SOLUTION INTRAVENOUS; SUBCUTANEOUS ONCE
Status: COMPLETED | OUTPATIENT
Start: 2023-10-17 | End: 2023-10-17

## 2023-10-17 NOTE — DISCHARGE INSTRUCTIONS
Dialysis will be [facility] In [address]  [phone]    Schedule Monday, Wednesday, Friday at 3:45pm  First appointment set for Friday, October 20th  You will need to arrive for you first appointment 30 minutes early to sign paperwork.

## 2023-10-17 NOTE — PLAN OF CARE
Mary Davis is Aox 4. Room air. NSR. Ambulating to bathroom. Dialysis completed last night. No fluid taken off. Patient resting in bed, All needs met at this time. Problem: Patient Centered Care  Goal: Patient preferences are identified and integrated in the patient's plan of care  Description: Interventions:  - What would you like us to know as we care for you?   - Provide timely, complete, and accurate information to patient/family  - Incorporate patient and family knowledge, values, beliefs, and cultural backgrounds into the planning and delivery of care  - Encourage patient/family to participate in care and decision-making at the level they choose  - Honor patient and family perspectives and choices  Outcome: Progressing     Problem: Patient/Family Goals  Goal: Patient/Family Long Term Goal  Description: Patient's Long Term Goal:    Interventions:  - See additional Care Plan goals for specific interventions  Outcome: Progressing  Goal: Patient/Family Short Term Goal  Description: Patient's Short Term Goal:     Interventions:   - See additional Care Plan goals for specific interventions  Outcome: Progressing     Problem: CARDIOVASCULAR - ADULT  Goal: Maintains optimal cardiac output and hemodynamic stability  Description: INTERVENTIONS:  - Monitor vital signs, rhythm, and trends  - Monitor for bleeding, hypotension and signs of decreased cardiac output  - Evaluate effectiveness of vasoactive medications to optimize hemodynamic stability  - Monitor arterial and/or venous puncture sites for bleeding and/or hematoma  - Assess quality of pulses, skin color and temperature  - Assess for signs of decreased coronary artery perfusion - ex.  Angina  - Evaluate fluid balance, assess for edema, trend weights  Outcome: Progressing  Goal: Absence of cardiac arrhythmias or at baseline  Description: INTERVENTIONS:  - Continuous cardiac monitoring, monitor vital signs, obtain 12 lead EKG if indicated  - Evaluate effectiveness of antiarrhythmic and heart rate control medications as ordered  - Initiate emergency measures for life threatening arrhythmias  - Monitor electrolytes and administer replacement therapy as ordered  Outcome: Progressing     Problem: METABOLIC/FLUID AND ELECTROLYTES - ADULT  Goal: Electrolytes maintained within normal limits  Description: INTERVENTIONS:  - Monitor labs and rhythm and assess patient for signs and symptoms of electrolyte imbalances  - Administer electrolyte replacement as ordered  - Monitor response to electrolyte replacements, including rhythm and repeat lab results as appropriate  - Fluid restriction as ordered  - Instruct patient on fluid and nutrition restrictions as appropriate  Outcome: Progressing

## 2023-10-17 NOTE — PLAN OF CARE
Patient transferred from CCU this afternoon to room 321 and started dialysis upon arrival to unit. Alert & orientedx4. On room air. Call light in reach. Problem: Patient Centered Care  Goal: Patient preferences are identified and integrated in the patient's plan of care  Description: Interventions:  - What would you like us to know as we care for you? From home with family  - Provide timely, complete, and accurate information to patient/family  - Incorporate patient and family knowledge, values, beliefs, and cultural backgrounds into the planning and delivery of care  - Encourage patient/family to participate in care and decision-making at the level they choose  - Honor patient and family perspectives and choices  Outcome: Progressing     Problem: Patient/Family Goals  Goal: Patient/Family Long Term Goal  Description: Patient's Long Term Goal: to go home    Interventions:  - follow physician orders   - See additional Care Plan goals for specific interventions  Outcome: Progressing  Goal: Patient/Family Short Term Goal  Description: Patient's Short Term Goal: tolerate dialysis    Interventions:   - dialysis as ordered  -meds as ordered  -nephrology consult  - See additional Care Plan goals for specific interventions  Outcome: Progressing     Problem: CARDIOVASCULAR - ADULT  Goal: Maintains optimal cardiac output and hemodynamic stability  Description: INTERVENTIONS:  - Monitor vital signs, rhythm, and trends  - Monitor for bleeding, hypotension and signs of decreased cardiac output  - Evaluate effectiveness of vasoactive medications to optimize hemodynamic stability  - Monitor arterial and/or venous puncture sites for bleeding and/or hematoma  - Assess quality of pulses, skin color and temperature  - Assess for signs of decreased coronary artery perfusion - ex.  Angina  - Evaluate fluid balance, assess for edema, trend weights  Outcome: Progressing  Goal: Absence of cardiac arrhythmias or at baseline  Description: INTERVENTIONS:  - Continuous cardiac monitoring, monitor vital signs, obtain 12 lead EKG if indicated  - Evaluate effectiveness of antiarrhythmic and heart rate control medications as ordered  - Initiate emergency measures for life threatening arrhythmias  - Monitor electrolytes and administer replacement therapy as ordered  Outcome: Progressing     Problem: METABOLIC/FLUID AND ELECTROLYTES - ADULT  Goal: Electrolytes maintained within normal limits  Description: INTERVENTIONS:  - Monitor labs and rhythm and assess patient for signs and symptoms of electrolyte imbalances  - Administer electrolyte replacement as ordered  - Monitor response to electrolyte replacements, including rhythm and repeat lab results as appropriate  - Fluid restriction as ordered  - Instruct patient on fluid and nutrition restrictions as appropriate  Outcome: Progressing

## 2023-10-17 NOTE — CM/SW NOTE
Jacinta Perez will be going to [facility] in [place]. His tentative schedule will be M, W, F at 3:45pm which works for his work needs. He is set up for this Friday pending his dc from acute care. / to remain available for support and/or discharge planning.       Payal Nurse ANDRAE BSN RN CRRN   RN Case Manager  [phone]

## 2023-10-18 ENCOUNTER — TELEPHONE (OUTPATIENT)
Dept: FAMILY MEDICINE CLINIC | Facility: CLINIC | Age: 33
End: 2023-10-18

## 2023-10-18 VITALS
DIASTOLIC BLOOD PRESSURE: 102 MMHG | BODY MASS INDEX: 38.36 KG/M2 | HEART RATE: 84 BPM | SYSTOLIC BLOOD PRESSURE: 135 MMHG | WEIGHT: 315 LBS | OXYGEN SATURATION: 98 % | TEMPERATURE: 98 F | HEIGHT: 76 IN | RESPIRATION RATE: 20 BRPM

## 2023-10-18 LAB
ANION GAP SERPL CALC-SCNC: 10 MMOL/L (ref 0–18)
BASOPHILS # BLD AUTO: 0.07 X10(3) UL (ref 0–0.2)
BASOPHILS NFR BLD AUTO: 0.8 %
BUN BLD-MCNC: 49 MG/DL (ref 7–18)
BUN/CREAT SERPL: 6.6 (ref 10–20)
CALCIUM BLD-MCNC: 8.8 MG/DL (ref 8.5–10.1)
CHLORIDE SERPL-SCNC: 106 MMOL/L (ref 98–112)
CO2 SERPL-SCNC: 23 MMOL/L (ref 21–32)
CREAT BLD-MCNC: 7.41 MG/DL
DEPRECATED RDW RBC AUTO: 35.7 FL (ref 35.1–46.3)
EGFRCR SERPLBLD CKD-EPI 2021: 9 ML/MIN/1.73M2 (ref 60–?)
EOSINOPHIL # BLD AUTO: 0.12 X10(3) UL (ref 0–0.7)
EOSINOPHIL NFR BLD AUTO: 1.4 %
ERYTHROCYTE [DISTWIDTH] IN BLOOD BY AUTOMATED COUNT: 12.5 % (ref 11–15)
GLUCOSE BLD-MCNC: 86 MG/DL (ref 70–99)
HCT VFR BLD AUTO: 36.5 %
HGB BLD-MCNC: 12.3 G/DL
IMM GRANULOCYTES # BLD AUTO: 0.02 X10(3) UL (ref 0–1)
IMM GRANULOCYTES NFR BLD: 0.2 %
LYMPHOCYTES # BLD AUTO: 2.46 X10(3) UL (ref 1–4)
LYMPHOCYTES NFR BLD AUTO: 27.9 %
MAGNESIUM SERPL-MCNC: 2.3 MG/DL (ref 1.6–2.6)
MCH RBC QN AUTO: 26.9 PG (ref 26–34)
MCHC RBC AUTO-ENTMCNC: 33.7 G/DL (ref 31–37)
MCV RBC AUTO: 79.7 FL
MONOCYTES # BLD AUTO: 0.79 X10(3) UL (ref 0.1–1)
MONOCYTES NFR BLD AUTO: 9 %
NEUTROPHILS # BLD AUTO: 5.36 X10 (3) UL (ref 1.5–7.7)
NEUTROPHILS # BLD AUTO: 5.36 X10(3) UL (ref 1.5–7.7)
NEUTROPHILS NFR BLD AUTO: 60.7 %
OSMOLALITY SERPL CALC.SUM OF ELEC: 300 MOSM/KG (ref 275–295)
PHOSPHATE SERPL-MCNC: 5.2 MG/DL (ref 2.5–4.9)
PLATELET # BLD AUTO: 278 10(3)UL (ref 150–450)
POTASSIUM SERPL-SCNC: 4.6 MMOL/L (ref 3.5–5.1)
RBC # BLD AUTO: 4.58 X10(6)UL
SODIUM SERPL-SCNC: 139 MMOL/L (ref 136–145)
WBC # BLD AUTO: 8.8 X10(3) UL (ref 4–11)

## 2023-10-18 PROCEDURE — 99239 HOSP IP/OBS DSCHRG MGMT >30: CPT | Performed by: INTERNAL MEDICINE

## 2023-10-18 PROCEDURE — 99233 SBSQ HOSP IP/OBS HIGH 50: CPT | Performed by: INTERNAL MEDICINE

## 2023-10-18 RX ORDER — HEPARIN SODIUM 1000 [USP'U]/ML
INJECTION, SOLUTION INTRAVENOUS; SUBCUTANEOUS
Status: COMPLETED
Start: 2023-10-18 | End: 2023-10-18

## 2023-10-18 RX ORDER — SEVELAMER CARBONATE 800 MG/1
800 TABLET, FILM COATED ORAL
Qty: 90 TABLET | Refills: 0 | Status: SHIPPED | OUTPATIENT
Start: 2023-10-18 | End: 2023-11-17

## 2023-10-18 RX ORDER — CALCITRIOL 0.25 UG/1
0.25 CAPSULE, LIQUID FILLED ORAL DAILY
Qty: 30 CAPSULE | Refills: 0 | Status: SHIPPED | OUTPATIENT
Start: 2023-10-19 | End: 2023-11-18

## 2023-10-18 NOTE — CM/SW NOTE
CM received a request for the tunneled catheter documentation that was not available at the time the referral was sent.     Documentation uploaded via [facility] ANDRAE BSN RN [facility]   RN Case Manager  [phone]

## 2023-10-18 NOTE — CM/SW NOTE
10/18/23 1200   Discharge disposition   Expected discharge disposition Home or Self   Outpatient services Dialysis  ([facility], [address] (MWF at 3:45PM))   Discharge transportation Private car       Per ROGER Schafer - pt will DC today after HD treatment. SW confirmed pt's Outpatient HD is arranged. DC order still pending from MD.    HD information in pt's AVS.    Pt is cleared from SW/CM stand point. ROGER Schafer is aware.     PLAN: Home w/ Outpatient HD at [address] [place] [address], [address] (MWF), start of care Friday 10/20      SHER Parker, [address] [facility]

## 2023-10-18 NOTE — PLAN OF CARE
Patient has safety precautions in place bed in the lowest position, bed alarm on, and call light within reach. Continue to monitor patient with intentional nursing rounds. Problem: Patient Centered Care  Goal: Patient preferences are identified and integrated in the patient's plan of care  Description: Interventions:    - Provide timely, complete, and accurate information to patient/family  - Incorporate patient and family knowledge, values, beliefs, and cultural backgrounds into the planning and delivery of care  - Encourage patient/family to participate in care and decision-making at the level they choose  - Honor patient and family perspectives and choices  Outcome: Progressing     Problem: CARDIOVASCULAR - ADULT  Goal: Maintains optimal cardiac output and hemodynamic stability  Description: INTERVENTIONS:  - Monitor vital signs, rhythm, and trends  - Monitor for bleeding, hypotension and signs of decreased cardiac output  - Evaluate effectiveness of vasoactive medications to optimize hemodynamic stability  - Monitor arterial and/or venous puncture sites for bleeding and/or hematoma  - Assess quality of pulses, skin color and temperature  - Assess for signs of decreased coronary artery perfusion - ex.  Angina  - Evaluate fluid balance, assess for edema, trend weights  Outcome: Progressing  Goal: Absence of cardiac arrhythmias or at baseline  Description: INTERVENTIONS:  - Continuous cardiac monitoring, monitor vital signs, obtain 12 lead EKG if indicated  - Evaluate effectiveness of antiarrhythmic and heart rate control medications as ordered  - Initiate emergency measures for life threatening arrhythmias  - Monitor electrolytes and administer replacement therapy as ordered  Outcome: Progressing     Problem: METABOLIC/FLUID AND ELECTROLYTES - ADULT  Goal: Electrolytes maintained within normal limits  Description: INTERVENTIONS:  - Monitor labs and rhythm and assess patient for signs and symptoms of electrolyte imbalances  - Administer electrolyte replacement as ordered  - Monitor response to electrolyte replacements, including rhythm and repeat lab results as appropriate  - Fluid restriction as ordered  - Instruct patient on fluid and nutrition restrictions as appropriate  Outcome: Progressing

## 2023-10-18 NOTE — PLAN OF CARE
AxOx4, RA, NSR, independent, good urine ourtput. Plan dialysis today (MWF)   Problem: Patient Centered Care  Goal: Patient preferences are identified and integrated in the patient's plan of care  Description: Interventions:  - What would you like us to know as we care for you? From home   - Provide timely, complete, and accurate information to patient/family  - Incorporate patient and family knowledge, values, beliefs, and cultural backgrounds into the planning and delivery of care  - Encourage patient/family to participate in care and decision-making at the level they choose  - Honor patient and family perspectives and choices  Outcome: Progressing     Problem: Patient/Family Goals  Goal: Patient/Family Long Term Goal  Description: Patient's Long Term Goal:  go home    Interventions:  - go home  - See additional Care Plan goals for specific interventions  Outcome: Progressing  Goal: Patient/Family Short Term Goal  Description: Patient's Short Term Goal: lower bp    Interventions:   - medication  -dialysis  - See additional Care Plan goals for specific interventions  Outcome: Progressing     Problem: CARDIOVASCULAR - ADULT  Goal: Maintains optimal cardiac output and hemodynamic stability  Description: INTERVENTIONS:  - Monitor vital signs, rhythm, and trends  - Monitor for bleeding, hypotension and signs of decreased cardiac output  - Evaluate effectiveness of vasoactive medications to optimize hemodynamic stability  - Monitor arterial and/or venous puncture sites for bleeding and/or hematoma  - Assess quality of pulses, skin color and temperature  - Assess for signs of decreased coronary artery perfusion - ex.  Angina  - Evaluate fluid balance, assess for edema, trend weights  Outcome: Progressing  Goal: Absence of cardiac arrhythmias or at baseline  Description: INTERVENTIONS:  - Continuous cardiac monitoring, monitor vital signs, obtain 12 lead EKG if indicated  - Evaluate effectiveness of antiarrhythmic and heart rate control medications as ordered  - Initiate emergency measures for life threatening arrhythmias  - Monitor electrolytes and administer replacement therapy as ordered  Outcome: Progressing     Problem: METABOLIC/FLUID AND ELECTROLYTES - ADULT  Goal: Electrolytes maintained within normal limits  Description: INTERVENTIONS:  - Monitor labs and rhythm and assess patient for signs and symptoms of electrolyte imbalances  - Administer electrolyte replacement as ordered  - Monitor response to electrolyte replacements, including rhythm and repeat lab results as appropriate  - Fluid restriction as ordered  - Instruct patient on fluid and nutrition restrictions as appropriate  Outcome: Progressing

## 2023-10-18 NOTE — TELEPHONE ENCOUNTER
Patient mother called stating that patient is in the hospital since Wednesday till now, does not know when he will be out. The patient works for [employer] and is in need for a note to be able and provide to his work wondering if they can obtain the letter via email or by [employer].

## 2023-10-18 NOTE — CM/SW NOTE
10: 25AM  Per MD, pt cleared for DC today and asking if outpatient HD is confirmed. SW spoke to ROGER Campbell at [facility] - stating they have not received tunneled cath report or HD flowsheets. Per SHIV Ghosh - tunneled cath sent in [facility] portal this AM. They did not ask CM for flowsheets. MK contacted ROGER Campbell again via phone. Cath report and HD flowsheets faxed to: [phone]. MK asked RN May notify SW when received and confirmed that pt can start w/ them on Friday 10/20.    11:00AM  Received call from ROGER Campbell w/ [facility]. Confirmed they received clinicals and that pt can start on Friday 10/20. Notified Dr. Stanislaw Vora. PLAN: Home w/ Outpatient HD at [facility] - pending med clear/DC order      SW/CM to remain available for support and/or discharge planning.          Edis Juarez, MSW, [facility]

## 2023-10-18 NOTE — TELEPHONE ENCOUNTER
Advised mom of Dr. Browning Field note. Mom verbalized understanding. Mom also stated that employer needed a letter covering patient for 9/21/2023 since called off of work due to a migraine. Patient currently admitted at [address] [place] [address]. Will try to get FMLA paperwork from employer in the meantime.

## 2023-10-18 NOTE — PLAN OF CARE
Patient is alert and oriented. RA. Tele in place. Voiding freely, using urinal. Up independently. HD catheter to right chest, CDI. Call light within reach. Problem: Patient Centered Care  Goal: Patient preferences are identified and integrated in the patient's plan of care  Description: Interventions:  - Provide timely, complete, and accurate information to patient/family  - Incorporate patient and family knowledge, values, beliefs, and cultural backgrounds into the planning and delivery of care  - Encourage patient/family to participate in care and decision-making at the level they choose  - Honor patient and family perspectives and choices  Outcome: Progressing     Problem: CARDIOVASCULAR - ADULT  Goal: Maintains optimal cardiac output and hemodynamic stability  Description: INTERVENTIONS:  - Monitor vital signs, rhythm, and trends  - Monitor for bleeding, hypotension and signs of decreased cardiac output  - Evaluate effectiveness of vasoactive medications to optimize hemodynamic stability  - Monitor arterial and/or venous puncture sites for bleeding and/or hematoma  - Assess quality of pulses, skin color and temperature  - Assess for signs of decreased coronary artery perfusion - ex.  Angina  - Evaluate fluid balance, assess for edema, trend weights  Outcome: Progressing  Goal: Absence of cardiac arrhythmias or at baseline  Description: INTERVENTIONS:  - Continuous cardiac monitoring, monitor vital signs, obtain 12 lead EKG if indicated  - Evaluate effectiveness of antiarrhythmic and heart rate control medications as ordered  - Initiate emergency measures for life threatening arrhythmias  - Monitor electrolytes and administer replacement therapy as ordered  Outcome: Progressing     Problem: METABOLIC/FLUID AND ELECTROLYTES - ADULT  Goal: Electrolytes maintained within normal limits  Description: INTERVENTIONS:  - Monitor labs and rhythm and assess patient for signs and symptoms of electrolyte imbalances  - Administer electrolyte replacement as ordered  - Monitor response to electrolyte replacements, including rhythm and repeat lab results as appropriate  - Fluid restriction as ordered  - Instruct patient on fluid and nutrition restrictions as appropriate  Outcome: Progressing

## 2023-10-18 NOTE — CDS QUERY
Robin Gil  Dear Doctor: Sam Temple    10/17- Hospitalist progress note   Elevated troponins.    -There has been no significant increase in the troponin.    -possibly demand in the setting of above. -EKG revealed nonspecific T-wave abnormality only. Cardio consult states dc home once off IV antihypertensives    Troponin lab results since admission 98--110--115--115  Admitted for Hypertensive urgency and BINU     PLEASE (X) ALL DIAGNOSES THAT APPLY. THIS SECTION FOR PROVIDER DOCUMENTATION ONLY    ( X)  Type 2 MI (due to demand ischemia)     ( )  Elevated Troponins without cardiac significance     ( )  Other (please specify): If you have any questions, please contact Clinical :  Jake Dale RN at [phone]     Thank You!     THIS FORM IS A PERMANENT PART OF THE MEDICAL RECORD

## 2023-10-18 NOTE — PROGRESS NOTES
Summary:  received referral for visit from KAREN garcia. Writer attempted to visit but patient was resting in bed. Spiritual Care support can be requested via an [facility] consult.     -Allison Romero.         10/18/23 1027   Clinical Encounter Type   Visited With Patient not available   Routine Visit Follow-up   Referral From    Trigger for Consult   Trigger for [facility] No

## 2023-10-18 NOTE — TELEPHONE ENCOUNTER
Patient mother called requesting a call back with assistance on stating the patient FMLA paperwork stated needs the help of the provider

## 2023-10-18 NOTE — TELEPHONE ENCOUNTER
Patient's mother advised that patient would need to get FMLA paperwork from employer and forward it to the doctor to complete. Gave mom fax number to forward paperwork or advised that can attach through [address] Mom verbalized understanding.

## 2023-10-19 ENCOUNTER — TELEPHONE (OUTPATIENT)
Dept: NEPHROLOGY | Facility: CLINIC | Age: 33
End: 2023-10-19

## 2023-10-19 ENCOUNTER — PATIENT OUTREACH (OUTPATIENT)
Dept: CASE MANAGEMENT | Age: 33
End: 2023-10-19

## 2023-10-19 DIAGNOSIS — Z02.9 ENCOUNTERS FOR UNSPECIFIED ADMINISTRATIVE PURPOSE: ICD-10-CM

## 2023-10-19 DIAGNOSIS — N18.9 ACUTE KIDNEY INJURY SUPERIMPOSED ON CHRONIC KIDNEY DISEASE: Primary | ICD-10-CM

## 2023-10-19 DIAGNOSIS — N17.9 ACUTE KIDNEY INJURY SUPERIMPOSED ON CHRONIC KIDNEY DISEASE: Primary | ICD-10-CM

## 2023-10-19 PROCEDURE — 1111F DSCHRG MED/CURRENT MED MERGE: CPT

## 2023-10-19 NOTE — TELEPHONE ENCOUNTER
Pts mother called to find out who Dr. James Francisco would refer to at [facility]. Please call.

## 2023-10-20 ENCOUNTER — TELEPHONE (OUTPATIENT)
Dept: FAMILY MEDICINE CLINIC | Facility: CLINIC | Age: 33
End: 2023-10-20

## 2023-10-20 LAB
ANTI-MPO ANTIBODIES: <0.2 UNITS
ANTI-PR3 ANTIBODIES: <0.2 UNITS
DSDNA AB TITR SER: <10 {TITER}
ENA RNP IGG SER IA-ACNC: 0.5 U/ML
ENA SM IGG SER IA-ACNC: <0.7 U/ML
ENA SS-A IGG SER IA-ACNC: 0.5 U/ML
ENA SS-B IGG SER IA-ACNC: <0.4 U/ML
U1 SNRNP IGG SER IA-ACNC: 1.4 U/ML

## 2023-10-20 NOTE — PROGRESS NOTES
Wily Holtmhsuzanne Pastranakelsea Pierson [address]  [facility] [address]  [phone]    NCM contacted cardiology to follow up on response to the following question:     NCM contacted cardiology to confirm nifedipine dose at discharge. The patient was discharge with the following Rx:   NIFEdipine ER 90 MG Tb24  Last time this was given: 60 mg on  October 18, 2023 8:24 AM     Take 1 tablet (90 mg total) by  mouth daily. carvedilol 6.25 MG Tabs  Take 1 tablet (6.25 mg total) by  mouth 2 (two) times daily with  meals. Nephrology recommended the following dose on 10/18/2023:     HTN:   On Nifedipine 90 mg daily and Coreg to 6.25 mg BID. -Reduce nifedipine to 60 mg once a day  Vitamin D def/Secondary hyperparathyroidism: Vitamin D 5.9 and iPTH 465. Started Vitamin D 50,000 units weekly and Calcitriol 0.25 mcg daily. The office TE is still pending recommendations by MD/NP. The office will follow up with the NCM with further recommendations.

## 2023-10-20 NOTE — TELEPHONE ENCOUNTER
Dr. Kay Ramos, pt has dialysis in [facility] at 3:15. Does it have to be an office visit or what about a video visit on 11-1 at like 6? Although, not sure how long his dialysis lasts.

## 2023-10-20 NOTE — TELEPHONE ENCOUNTER
Spoke with MD. Pt has not been seen since 2019. Sees different nephrologist. Mayte Brumfield to speak with current nephrologist. Case Rover msg sent.

## 2023-10-20 NOTE — TELEPHONE ENCOUNTER
MD Mirela Ogden, RN  Caller: Unspecified (Yesterday,  1:17 PM)  Pt needs to see me for follow up to discuss

## 2023-10-23 ENCOUNTER — TELEPHONE (OUTPATIENT)
Dept: FAMILY MEDICINE CLINIC | Facility: CLINIC | Age: 33
End: 2023-10-23

## 2023-10-23 NOTE — TELEPHONE ENCOUNTER
[employer] message with [place] forms received and logged for processing.  No Alvy Sick message sent for India Harper

## 2023-10-23 NOTE — TELEPHONE ENCOUNTER
Mother of Patient called office. Date of birth and full name both confirmed. Saying that for an unknown reason, [facility] cannot dispense the carvedilol. RN encouraged Jennifer Mackenzie (mother) to reach out to [employer], directly, speak with someone- and inquire specifically what the issue is - it may be a matter of inventory. But if order needs adjustment, need to reach out to [address] or if issues, can call our office to see if Dr. Coral Hill can write the order. Asking about carvedilol 6.25mg   Take 1 tablet (6.25 mg total) by mouth 2 (two) times daily with meals. , Normal, Disp-60 tablet, R-1   Dispense: 60 tablet   Refills: 1 ordered   Pharmacy: [facility] [address] [phone], [phone] (Ph: [phone])   Order Details  Ordered on: 10/13/23   Authorizing provider: MARISELA Justin

## 2023-10-24 ENCOUNTER — OFFICE VISIT (OUTPATIENT)
Dept: FAMILY MEDICINE CLINIC | Facility: CLINIC | Age: 33
End: 2023-10-24

## 2023-10-24 ENCOUNTER — TELEPHONE (OUTPATIENT)
Dept: FAMILY MEDICINE CLINIC | Facility: CLINIC | Age: 33
End: 2023-10-24

## 2023-10-24 VITALS
OXYGEN SATURATION: 98 % | BODY MASS INDEX: 40 KG/M2 | HEART RATE: 100 BPM | RESPIRATION RATE: 16 BRPM | TEMPERATURE: 99 F | SYSTOLIC BLOOD PRESSURE: 114 MMHG | WEIGHT: 315 LBS | DIASTOLIC BLOOD PRESSURE: 76 MMHG

## 2023-10-24 DIAGNOSIS — I10 ESSENTIAL HYPERTENSION: ICD-10-CM

## 2023-10-24 DIAGNOSIS — G47.33 OSA (OBSTRUCTIVE SLEEP APNEA): ICD-10-CM

## 2023-10-24 DIAGNOSIS — N18.6 ESRD (END STAGE RENAL DISEASE) (HCC): ICD-10-CM

## 2023-10-24 DIAGNOSIS — Z09 HOSPITAL DISCHARGE FOLLOW-UP: Primary | ICD-10-CM

## 2023-10-24 PROCEDURE — 3074F SYST BP LT 130 MM HG: CPT | Performed by: FAMILY MEDICINE

## 2023-10-24 PROCEDURE — 3078F DIAST BP <80 MM HG: CPT | Performed by: FAMILY MEDICINE

## 2023-10-24 PROCEDURE — 99495 TRANSJ CARE MGMT MOD F2F 14D: CPT | Performed by: FAMILY MEDICINE

## 2023-10-24 RX ORDER — NIFEDIPINE 60 MG/1
60 TABLET, FILM COATED, EXTENDED RELEASE ORAL DAILY
Qty: 90 TABLET | Refills: 0 | Status: SHIPPED | OUTPATIENT
Start: 2023-10-24

## 2023-10-24 NOTE — TELEPHONE ENCOUNTER
Pharmacy calling to address the following     Patient picked up   NIFEdipine ER 90 MG Oral Tablet 24 Hr   Prescribed by Kala Lai NP in [place] (outside provider) last week    Was prescribed   NIFEdipine ER 60 MG Oral Tablet 24 Hr   By Dr. Loco Chavez today    Pharmacy wants to know if they need to put a hold on Dr. Chavez's prescription.     Callback with instructions on how to proceed, [phone]

## 2023-10-25 NOTE — TELEPHONE ENCOUNTER
Patient's discharge summary reviewed.  Patient was supposed to be on nifedipine 60 mg ER upon discharge from hospital.  I am copying Kala on this message-can you review this discharge summary and confirm this?

## 2023-10-26 ENCOUNTER — TELEPHONE (OUTPATIENT)
Dept: FAMILY MEDICINE CLINIC | Facility: CLINIC | Age: 33
End: 2023-10-26

## 2023-10-26 NOTE — TELEPHONE ENCOUNTER
[facility] pharmacy called to confirm the strength on medication below. Pharmacy said he picked up a 90mg before and wants to confirm if Dr is lowering dosage to 60mg.      Medication Quantity Refills Start End   NIFEdipine ER 60 MG Oral Tablet 24 Hr

## 2023-10-27 ENCOUNTER — TELEPHONE (OUTPATIENT)
Dept: FAMILY MEDICINE CLINIC | Facility: CLINIC | Age: 33
End: 2023-10-27

## 2023-10-27 NOTE — TELEPHONE ENCOUNTER
Roberta Carreon:  please review: is patient to hold carvedilol while does dialysis? Mother, Maryam Deras called. She conference patient on the phone. Patient currently at dialysis until 8pm;  BP standing  110/49  116/57 (sitting)  Since resuming carvedilol, patient shoulders feels heavy, neck pain; felt tired. No chest pain. Was off carvedilol for a week. This is 2nd day of taking carvediolol he started noticing his BP was lower. Current dose: Carvedilol 6.25mg 1 po BID. His dialysis center said he might need to reduce dose. Please advise.

## 2023-11-01 ENCOUNTER — TELEPHONE (OUTPATIENT)
Dept: NEPHROLOGY | Facility: CLINIC | Age: 33
End: 2023-11-01

## 2023-11-01 NOTE — TELEPHONE ENCOUNTER
Called patient and discussed ANCA and DsDNA results. Discussed with Dr. Harriett Dandy. No system symptoms. Patient on dialysis.      Patient was counseled to discuss the results with transplant nephrologist

## 2023-11-08 NOTE — TELEPHONE ENCOUNTER
Dr. Kianna Cooper,     *The ACKNOWLEDGE button has been moved to the top right ribbon*    Please sign off on form if you agree to: Continuous FMLA due to pt's hypertension hospitalization, 1011/23-10/30/23  (place your signature on the first page only)    -From your Inbasket, Highlight the patient and click Chart   -Double click the 47/76/82 Forms Completion telephone encounter  -Scroll down to the Media section   -Click the blue Hyperlink: FMLA Dr Kianna Cooper 41/6/30  -Click Acknowledge located in the top right ribbon/menu   -Drag the mouse into the blank space of the document and a + sign will appear. Left click to   electronically sign the document.      Thank you,      Hailey Erb

## 2023-11-14 ENCOUNTER — TELEPHONE (OUTPATIENT)
Dept: FAMILY MEDICINE CLINIC | Facility: CLINIC | Age: 33
End: 2023-11-14

## 2023-11-14 NOTE — TELEPHONE ENCOUNTER
Spoke with pt who states will be submitting new forms for pt to be covered for dialysis. Pt is still unsure of how much time he will be requesting. Pt has an appt with Dr. Ashley on 11/30, will discuss w/provider and will call back once he knows all details.

## 2023-11-28 ENCOUNTER — TELEPHONE (OUTPATIENT)
Dept: FAMILY MEDICINE CLINIC | Facility: CLINIC | Age: 33
End: 2023-11-28

## 2023-11-28 NOTE — TELEPHONE ENCOUNTER
Patient and Mother Maryanne khalil (identified name and ) states his dialysis schedule was thrown off last week due to the holiday so had to take off work to accommodate the adjusted schedule. Had dialysis on ,  and today instead of M/W/F. Requesting an excuse note for work from  to , both due to the schedule and that he did not feel well during this period due to the change. Note pended for your review and approval.   Patient requesting it to be sent via [address]

## 2023-11-30 ENCOUNTER — OFFICE VISIT (OUTPATIENT)
Dept: NEPHROLOGY | Facility: CLINIC | Age: 33
End: 2023-11-30

## 2023-11-30 VITALS
SYSTOLIC BLOOD PRESSURE: 150 MMHG | WEIGHT: 315 LBS | DIASTOLIC BLOOD PRESSURE: 100 MMHG | HEIGHT: 76 IN | HEART RATE: 95 BPM | BODY MASS INDEX: 38.36 KG/M2

## 2023-11-30 DIAGNOSIS — N18.6 ESRD (END STAGE RENAL DISEASE) (HCC): Primary | ICD-10-CM

## 2023-11-30 DIAGNOSIS — I10 ESSENTIAL HYPERTENSION: ICD-10-CM

## 2023-11-30 PROCEDURE — 99215 OFFICE O/P EST HI 40 MIN: CPT | Performed by: INTERNAL MEDICINE

## 2023-11-30 PROCEDURE — 3008F BODY MASS INDEX DOCD: CPT | Performed by: INTERNAL MEDICINE

## 2023-11-30 PROCEDURE — 3080F DIAST BP >= 90 MM HG: CPT | Performed by: INTERNAL MEDICINE

## 2023-11-30 PROCEDURE — 3077F SYST BP >= 140 MM HG: CPT | Performed by: INTERNAL MEDICINE

## 2023-11-30 RX ORDER — CARVEDILOL 12.5 MG/1
12.5 TABLET ORAL 2 TIMES DAILY WITH MEALS
Qty: 60 TABLET | Refills: 1 | Status: SHIPPED | OUTPATIENT
Start: 2023-11-30

## 2023-12-01 NOTE — PATIENT INSTRUCTIONS
Increase carvedilol to 12.5 mg twice a day I called it in    Take nifedipine in the evening with your evening carvedilol do not take prior to dialysis    Work on weight loss would like you to get down to about 250 or 260 if possible    Get your COVID-vaccine    Call and ask for  Althea Easley  To investigate home dialysis  [phone]    Call me in a few weeks after your [facility] appointment and let me know how your home dialysis visit goes    I will get your labs from your current center in [address] [place] [address] Karen Mccarthyvard to see all of you lets work together on this  [employer]

## 2023-12-01 NOTE — PROGRESS NOTES
Progress Note     Timoteo Rico    Is here with his brother and his father his brother's name is Bryan Bruner I also spoke to his mother Sarika Augustin on the phone they are here for a kidney visit patient is known to me  Patient has uncontrolled hypertension genetic father and brother were in the room mother on the phone ended up on dialysis now in [place] when he moved 4 hours 3 times a week would like to either go to home dialysis or come closer has not seen a doctor at his unit  Told him that I cannot take care of him unless he comes up to [place] [address] or [facility] feels okay up-to-date with immunizations needs his COVID shot blood pressures been running high currently taking nifedipine 60/day and Coreg 6.25 twice daily also on phosphorus binder feels good appetite good no edema        HISTORY:  Past Medical History:   Diagnosis Date    Essential hypertension       Past Surgical History:   Procedure Laterality Date    HIP SURGERY Right Age 17    sports injury      Social History     Tobacco Use    Smoking status: Never     Passive exposure: Never    Smokeless tobacco: Never   Substance Use Topics    Alcohol use: Yes     Alcohol/week: 0.0 standard drinks of alcohol     Comment: social         Medications (Active prior to today's visit):  Current Outpatient Medications   Medication Sig Dispense Refill    NIFEdipine ER 60 MG Oral Tablet 24 Hr Take 1 tablet (60 mg total) by mouth daily. 90 tablet 0    aspirin 81 MG Oral Chew Tab Chew 1 tablet (81 mg total) by mouth daily. carvedilol 6.25 MG Oral Tab Take 1 tablet (6.25 mg total) by mouth 2 (two) times daily with meals. (Patient taking differently: Take 1 tablet (6.25 mg total) by mouth daily.) 60 tablet 1    Ergocalciferol (VITAMIN D OR) Take by mouth.          Allergies:  No Known Allergies      ROS:     Constitutional:  Negative for decreased activity, fever, irritability and lethargy  ENMT:  Negative for ear drainage, hearing loss and nasal drainage  Eyes:  Negative for eye discharge and vision loss  Cardiovascular:  Negative for chest pain, sob  Respiratory:  Negative for cough, dyspnea and wheezing  Gastrointestinal:  Negative for abdominal pain, constipation  Genitourinary:  Negative for dysuria and hematuria  Endocrine:  Negative for abnormal sleep patterns  Hema/Lymph:  Negative for easy bleeding and easy bruising  Integumentary:  Negative for pruritus and rash  Musculoskeletal:  Negative for bone/joint symptoms  Neurological:  Negative for gait disturbance  Psychiatric:  Negative for inappropriate interaction and psychiatric symptoms      Vitals:    11/30/23 1817   BP: (!) 150/100   Pulse:        PHYSICAL EXAM:   Constitutional: appears well hydrated alert and responsive very overweight  Head/Face: normocephalic  Eyes/Vision: normal extraocular motion is intact  Nose/Mouth/Throat:mucous membranes are moist   Neck/Thyroid: neck is supple without adenopathy  Lymphatic: no abnormal cervical, supraclavicular adenopathy is noted  Respiratory:  lungs are clear to auscultation bilaterally  Cardiovascular: regular rate and rhythm   Abdomen: soft, non-tender, non-distended, BS normal  Skin/Hair: no unusual rashes present, no abnormal bruising noted  Back/Spine: no abnormalities noted  Musculoskeletal: no deformities  Extremities: no edema  Neurological:  Grossly normal       ASSESSMENT/PLAN:   Assessment   Encounter Diagnoses   Name Primary?     ESRD (end stage renal disease) ([facility] [address] Yes    Essential hypertension     BMI 40.0-44.9, [facility] ([facility]        #1 ESRD  Has appointment with [facility] for kidney transplant recommend meeting my nurse Olesay for peritoneal dialysis evaluation he will do that  He can transfer to [facility] or [place] [address] if he would like but is far from his house continue phosphorus binders      #2 hypertension uncontrolled  Increase Coreg to 12. 5 twice daily    Keep  Commended blood pressure e pressure keep less than 130/80 take Coreg twice a day and take nifedipine at night    Recommended COVID-vaccine follow-up with me in a week or 2 spent 1 hour with patient and family           Orders This Visit:  No orders of the defined types were placed in this encounter. Meds This Visit:  Requested Prescriptions      No prescriptions requested or ordered in this encounter       Imaging & Referrals:  None     11/30/2023  Dakota Soto MD

## 2024-01-09 NOTE — TELEPHONE ENCOUNTER
Fmla received in forms dept. Logged for processing. Sent [employer] message for missing WAQAS. Please refer to [employer] message dated: 12/28/23. Dr. Chavez will sign FMLA forms.

## 2024-02-07 NOTE — TELEPHONE ENCOUNTER
Please review; protocol failed/ has no protocol    Requested Prescriptions   Pending Prescriptions Disp Refills    NIFEDIPINE ER 60 MG Oral Tablet 24 Hr [Pharmacy Med Name: NIFEdipine ER Oral Tablet Extended Release 24 Hour 60 MG] 90 tablet 0     Sig: TAKE ONE TABLET BY MOUTH ONE TIME DAILY       Hypertension Medications Protocol Failed - 2/7/2024  2:31 PM        Failed - Last BP reading less than 140/90     BP Readings from Last 1 Encounters:   11/30/23 (!) 150/100               Failed - EGFRCR or GFRAA > 50     GFR Evaluation  EGFRCR: 9 , resulted on 10/18/2023          Passed - CMP or BMP in past 12 months        Passed - In person appointment or virtual visit in the past 12 mos or appointment in next 3 mos     Recent Outpatient Visits              2 months ago ESRD (end stage renal disease) ([facility])    [facility], [place] Loredo MD    Office Visit    3 months ago Hospital discharge follow-up    [facility] Loco Edouard MD    Office Visit    3 months ago Uncontrolled hypertension    [facility] Loco Edouard MD    Office Visit    2 years ago Fever after COVID-19 vaccination    [facility] Loco Edouard MD    Telemedicine    [facility]                         Recent Outpatient Visits              2 months ago ESRD (end stage renal disease) ([facility])    [facility], [place] Loredo MD    Office Visit    3 months ago [facility]    3 months ago [facility]    [facility] Visit    2 years ago Fever after  COVID-19 vaccination    [facility] Loco Enciso MD    Telemedicine    3 years ago Urinary frequency    [facility] Loco Edouard MD    Office Visit

## 2024-03-25 RX ORDER — CARVEDILOL 12.5 MG/1
12.5 TABLET ORAL 2 TIMES DAILY WITH MEALS
Qty: 60 TABLET | Refills: 1 | Status: SHIPPED | OUTPATIENT
Start: 2024-03-25

## 2024-04-29 ENCOUNTER — ORDER TRANSCRIPTION (OUTPATIENT)
Dept: SLEEP CENTER | Age: 34
End: 2024-04-29

## 2024-05-13 ENCOUNTER — OFFICE VISIT (OUTPATIENT)
Dept: FAMILY MEDICINE CLINIC | Facility: CLINIC | Age: 34
End: 2024-05-13

## 2024-05-13 VITALS
HEART RATE: 86 BPM | DIASTOLIC BLOOD PRESSURE: 84 MMHG | WEIGHT: 315 LBS | BODY MASS INDEX: 40 KG/M2 | SYSTOLIC BLOOD PRESSURE: 135 MMHG | TEMPERATURE: 98 F

## 2024-05-13 DIAGNOSIS — M54.6 ACUTE BILATERAL THORACIC BACK PAIN: ICD-10-CM

## 2024-05-13 DIAGNOSIS — Z04.1 ENCOUNTER FOR EXAMINATION FOLLOWING MOTOR VEHICLE ACCIDENT (MVA): Primary | ICD-10-CM

## 2024-05-13 DIAGNOSIS — I10 ESSENTIAL HYPERTENSION: ICD-10-CM

## 2024-05-13 DIAGNOSIS — G47.33 OSA (OBSTRUCTIVE SLEEP APNEA): ICD-10-CM

## 2024-05-13 DIAGNOSIS — N18.5 CHRONIC KIDNEY DISEASE (CKD), STAGE V (HCC): ICD-10-CM

## 2024-05-13 PROCEDURE — 3075F SYST BP GE 130 - 139MM HG: CPT | Performed by: FAMILY MEDICINE

## 2024-05-13 PROCEDURE — 3079F DIAST BP 80-89 MM HG: CPT | Performed by: FAMILY MEDICINE

## 2024-05-13 PROCEDURE — 99214 OFFICE O/P EST MOD 30 MIN: CPT | Performed by: FAMILY MEDICINE

## 2024-05-13 RX ORDER — CINACALCET 30 MG/1
1 TABLET, FILM COATED ORAL
COMMUNITY
Start: 2024-02-21
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
## 2024-05-15 ENCOUNTER — TELEPHONE (OUTPATIENT)
Dept: FAMILY MEDICINE CLINIC | Facility: CLINIC | Age: 34
End: 2024-05-15

## 2024-05-15 DIAGNOSIS — Z04.1 ENCOUNTER FOR EXAMINATION FOLLOWING MOTOR VEHICLE ACCIDENT (MVA): Primary | ICD-10-CM

## 2024-05-15 DIAGNOSIS — M54.6 ACUTE BILATERAL THORACIC BACK PAIN: ICD-10-CM

## 2024-05-15 NOTE — TELEPHONE ENCOUNTER
Referral pending your review and approval.         ----- Message from Katrina EASON sent at 5/15/2024 10:22 AM CDT -----  Regarding: FW: Back pain and soreness   Contact: [phone]      ----- Message -----  From: Ryder Wheeler  Sent: 5/15/2024   9:20 AM CDT  To: Em Triage Support  Subject: Back pain and soreness                           Daisy Chavez I would like to do the physical therapy for my back it has gotten worse like you said it might and I’m uncomfortable sleeping and sitting. Let me know what I have to do thanks.

## 2024-06-11 ENCOUNTER — TELEPHONE (OUTPATIENT)
Dept: NEPHROLOGY | Facility: CLINIC | Age: 34
End: 2024-06-11

## 2024-06-12 RX ORDER — CARVEDILOL 12.5 MG/1
12.5 TABLET ORAL 2 TIMES DAILY WITH MEALS
Qty: 60 TABLET | Refills: 2 | Status: SHIPPED | OUTPATIENT
Start: 2024-06-12

## 2024-06-12 NOTE — TELEPHONE ENCOUNTER
Current Outpatient Medications   Medication Sig Dispense Refill    carvedilol 12.5 MG Oral Tab Take 1 tablet (12.5 mg total) by mouth 2 (two) times daily with meals. 60 tablet 1      A 30 day was last filled on 4/9/24. Please disregard this communication if this medication was changed or discontinue.

## 2024-06-12 NOTE — TELEPHONE ENCOUNTER
LOV-11/30/23  Dialysis patient   Please sign  pending order thanks.  Rn confirmed with patient is still taking med listed below.  Rn sent Rx per protocol when MD is out of the office.
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
## 2024-07-15 ENCOUNTER — TELEPHONE (OUTPATIENT)
Dept: FAMILY MEDICINE CLINIC | Facility: CLINIC | Age: 34
End: 2024-07-15

## 2024-07-15 NOTE — TELEPHONE ENCOUNTER
Please try to avoid signing forms in the corner as it is not visible when printing and forms are not accepted this way. Thank you!    Dr. Chavez,    **The ACKNOWLEDGE button has been moved to the top right ribbon**    Please sign off on form if you agree to: Americans with Disabilities Act  Work from home until 11/22/24  (place your signature on the first page only)  -From your Inbasket, Highlight the patient and click Chart  -Double click the 7/15/24 Forms Completion telephone encounter  -Scroll down to the Media section  -Click the blue Hyperlink: Stacy Chavez 7/15/24    -Click Acknowledge located in the top right ribbon/menu  -Drag the mouse into the blank space of the document and a + sign will appear. Left click to  electronically sign the document.    Thank you,    Day

## 2024-07-15 NOTE — TELEPHONE ENCOUNTER
Patient called in regards to Americans with Disabilities Act form he submitted via [employer] on 7/8/24. Form received and logged for processing.

## 2024-07-22 NOTE — TELEPHONE ENCOUNTER
Please try to avoid signing forms in the corner as it is not visible when printing and forms are not accepted this way. Thank you!     Dr. Chavez, *SECOND ATTEMPT*     **The ACKNOWLEDGE button has been moved to the top right ribbon**     Please sign off on form if you agree to: Americans with Disabilities Act  Work from home until 11/22/24  (place your signature on the first page only)  -From your Inbasket, Highlight the patient and click Chart  -Double click the 7/15/24 Forms Completion telephone encounter  -Scroll down to the Media section  -Click the blue Hyperlink: Stacy Chavez 7/15/24     -Click Acknowledge located in the top right ribbon/menu  -Drag the mouse into the blank space of the document and a + sign will appear. Left click to  electronically sign the document.     Thank you,     Day

## 2024-07-22 NOTE — TELEPHONE ENCOUNTER
Patient called requesting status of forms. Informed patient forms are completed and are just waiting on signature. Patient verbalized understanding.

## 2024-07-23 NOTE — TELEPHONE ENCOUNTER
Patient called to check status of Americans with Disabilities Act forms. Informed patient forms completed and signed by provider. No Release of Information on file. Patient asked forms uploaded and sent via [employer]. Request completed.

## 2024-10-15 ENCOUNTER — OFFICE VISIT (OUTPATIENT)
Dept: FAMILY MEDICINE CLINIC | Facility: CLINIC | Age: 34
End: 2024-10-15

## 2024-10-15 VITALS
WEIGHT: 315 LBS | RESPIRATION RATE: 17 BRPM | BODY MASS INDEX: 38.36 KG/M2 | SYSTOLIC BLOOD PRESSURE: 126 MMHG | HEIGHT: 76 IN | DIASTOLIC BLOOD PRESSURE: 78 MMHG

## 2024-10-15 DIAGNOSIS — M54.6 ACUTE BILATERAL THORACIC BACK PAIN: Primary | ICD-10-CM

## 2024-10-15 DIAGNOSIS — I10 ESSENTIAL HYPERTENSION: ICD-10-CM

## 2024-10-15 DIAGNOSIS — N18.6 ESRD (END STAGE RENAL DISEASE) (HCC): ICD-10-CM

## 2024-10-15 DIAGNOSIS — L30.9 DERMATITIS: ICD-10-CM

## 2024-10-15 DIAGNOSIS — Z23 FLU VACCINE NEED: ICD-10-CM

## 2024-10-15 PROCEDURE — 3008F BODY MASS INDEX DOCD: CPT | Performed by: FAMILY MEDICINE

## 2024-10-15 PROCEDURE — 3078F DIAST BP <80 MM HG: CPT | Performed by: FAMILY MEDICINE

## 2024-10-15 PROCEDURE — 90471 IMMUNIZATION ADMIN: CPT | Performed by: FAMILY MEDICINE

## 2024-10-15 PROCEDURE — 90656 IIV3 VACC NO PRSV 0.5 ML IM: CPT | Performed by: FAMILY MEDICINE

## 2024-10-15 PROCEDURE — 99214 OFFICE O/P EST MOD 30 MIN: CPT | Performed by: FAMILY MEDICINE

## 2024-10-15 PROCEDURE — 3074F SYST BP LT 130 MM HG: CPT | Performed by: FAMILY MEDICINE

## 2024-10-15 RX ORDER — NYSTATIN AND TRIAMCINOLONE ACETONIDE 100000; 1 [USP'U]/G; MG/G
1 CREAM TOPICAL 2 TIMES DAILY
Qty: 15 G | Refills: 0 | Status: SHIPPED | OUTPATIENT
Start: 2024-10-15
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
## 2024-10-21 ENCOUNTER — PATIENT MESSAGE (OUTPATIENT)
Dept: FAMILY MEDICINE CLINIC | Facility: CLINIC | Age: 34
End: 2024-10-21

## 2024-10-21 DIAGNOSIS — K21.9 GASTROESOPHAGEAL REFLUX DISEASE, UNSPECIFIED WHETHER ESOPHAGITIS PRESENT: Primary | ICD-10-CM

## 2024-10-22 NOTE — TELEPHONE ENCOUNTER
Devan message with instruction, patient has PPO insurance .     DR Chavez=any additional recommendation ? Thanks.     LAST VISIT 10/15/24 =Nothing was mentioned about GERD.  No future appointments.

## 2024-11-12 ENCOUNTER — HOSPITAL ENCOUNTER (OUTPATIENT)
Dept: GENERAL RADIOLOGY | Facility: HOSPITAL | Age: 34
Discharge: HOME | End: 2024-11-12
Attending: PHYSICAL MEDICINE & REHABILITATION
Payer: COMMERCIAL

## 2024-11-12 ENCOUNTER — OFFICE VISIT (OUTPATIENT)
Dept: PHYSICAL MEDICINE AND REHAB | Facility: CLINIC | Age: 34
End: 2024-11-12
Payer: COMMERCIAL

## 2024-11-12 VITALS — BODY MASS INDEX: 38.36 KG/M2 | HEIGHT: 76 IN | WEIGHT: 315 LBS

## 2024-11-12 DIAGNOSIS — M47.816 LUMBAR FACET JOINT SYNDROME: ICD-10-CM

## 2024-11-12 DIAGNOSIS — M47.816 LUMBAR FACET JOINT SYNDROME: Primary | ICD-10-CM

## 2024-11-12 DIAGNOSIS — N18.6 ESRD (END STAGE RENAL DISEASE) (HCC): ICD-10-CM

## 2024-11-12 DIAGNOSIS — K21.9 GASTROESOPHAGEAL REFLUX DISEASE WITHOUT ESOPHAGITIS: ICD-10-CM

## 2024-11-12 DIAGNOSIS — I10 ESSENTIAL HYPERTENSION: ICD-10-CM

## 2024-11-12 PROCEDURE — 99244 OFF/OP CNSLTJ NEW/EST MOD 40: CPT | Performed by: PHYSICAL MEDICINE & REHABILITATION

## 2024-11-12 PROCEDURE — 3008F BODY MASS INDEX DOCD: CPT | Performed by: PHYSICAL MEDICINE & REHABILITATION

## 2024-11-12 PROCEDURE — 72114 X-RAY EXAM L-S SPINE BENDING: CPT | Performed by: PHYSICAL MEDICINE & REHABILITATION

## 2024-11-12 RX ORDER — MELOXICAM 15 MG/1
15 TABLET ORAL DAILY
Qty: 14 TABLET | Refills: 0 | Status: SHIPPED | OUTPATIENT
Start: 2024-11-12 | End: 2024-11-26

## 2024-11-12 NOTE — PATIENT INSTRUCTIONS
X-ray of the lumbar spine on the way out  MRI of the lumbar spine and follow-up after  Ice and heat as tolerated  Mobic daily for the next 7 to 10 days
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
## 2024-12-05 ENCOUNTER — PATIENT MESSAGE (OUTPATIENT)
Dept: FAMILY MEDICINE CLINIC | Facility: CLINIC | Age: 34
End: 2024-12-05

## 2024-12-05 NOTE — TELEPHONE ENCOUNTER
EM Forms Completion Team: Please see 24  [facility] message with attached FMLA form.    Dr Chavez, Please see patient's 24 [employer] Message asking for a work note for 24 and  24 due to  FMLA.

## 2024-12-06 ENCOUNTER — TELEPHONE (OUTPATIENT)
Dept: FAMILY MEDICINE CLINIC | Facility: CLINIC | Age: 34
End: 2024-12-06

## 2024-12-06 NOTE — TELEPHONE ENCOUNTER
Received Family Medical Leave Act forms in the forms department via [employer].Sent [employer] message for authorization.  Created e-mail and logged for processing

## 2024-12-16 NOTE — TELEPHONE ENCOUNTER
Dr. Chavez, please see updated message from patient. Letter pended, please review andn sign if appropriate. Thanks.    Please respond directly to the patient if no additional staff support is required.     Future Appointments   Date Time Provider Department Center   1/23/2025  1:30 PM Amarilis Rayo MD [facility]

## 2024-12-18 NOTE — TELEPHONE ENCOUNTER
Dr. Chavez,    Patient is requesting intermittent Family Medical Leave Act due to ESRD. Patient is requesting 1-5 flare ups per month, each lasting 1-2 days. Appointments 1-2 per month, lasting 1-4 hours.     Do you support?    Thank you,  Ledy

## 2024-12-18 NOTE — TELEPHONE ENCOUNTER
Called patient to obtain detail. Patient requested to upload completed forms to his [employer].     Type of Leave: intermittent Family Medical Leave Act   Reason for Leave: ESRD   Start date of leave: 12/1/24  End date of leave: 12/1/25  How many flare ups per month/length?: 1-5 flare ups per month, each lasting 1 day.  How many appts per month/length?: 1 appointment per month each lasting 1-4 hours  Was Fee and Turnaround info Given?: yes

## 2024-12-23 NOTE — TELEPHONE ENCOUNTER
Dr. Chavez,    Please sign off on form if you agree to: Family Medical Leave Act due to ESRD    -Signature page will be the first page scanned  -From your Inbasket, Highlight the patient and click Chart   -Double click the 12/6/24 Forms Completion telephone encounter  -Scroll down to the Media section   -Click the blue Hyperlink: Family Medical Leave Act Dr. Chavez 12/23/24  -Click Acknowledge located in the top right ribbon/menu   -Drag the mouse into the blank space of the document and a + sign will appear. Left click to   electronically sign the document.  -Once signed, simply exit out of the screen and you signature will be saved.     Thank you,     Ledy

## 2024-12-26 NOTE — TELEPHONE ENCOUNTER
Dr. Chavez,    My apologies the signature was not captured on the form. Can you   Please try signing the form re scanned. Let me know if you have   Any issues. Thank you!     Please sign off on form if you agree to: Family Medical Leave Act due to ESRD     -Signature page will be the first page scanned  -From your Inbasket, Highlight the patient and click Chart   -Double click the 12/6/24 Forms Completion telephone encounter  -Scroll down to the Media section   -Click the blue Hyperlink: Family Medical Leave Act Dr. Chavez 12/26/24  -Click Acknowledge located in the top right ribbon/menu   -Drag the mouse into the blank space of the document and a + sign will appear. Left click to   electronically sign the document.  -Once signed, simply exit out of the screen and you signature will be saved.     Thank you,      Ledy

## 2025-01-23 RX ORDER — CARVEDILOL 12.5 MG/1
12.5 TABLET ORAL 2 TIMES DAILY WITH MEALS
Qty: 180 TABLET | Refills: 3 | OUTPATIENT
Start: 2025-01-23

## 2025-01-24 ENCOUNTER — TELEPHONE (OUTPATIENT)
Dept: FAMILY MEDICINE CLINIC | Facility: CLINIC | Age: 35
End: 2025-01-24

## 2025-01-24 NOTE — TELEPHONE ENCOUNTER
Name and  verified. Patient called asking why his refill for carvedilol got denied. Informed the patient the medication is prescribed by his nephrologist and he needs to call their office to schedule an appointment if he wants to receive refills. Per the note from . Patient verbalized understanding.

## 2025-03-06 ENCOUNTER — PATIENT MESSAGE (OUTPATIENT)
Dept: FAMILY MEDICINE CLINIC | Facility: CLINIC | Age: 35
End: 2025-03-06

## 2025-03-22 ENCOUNTER — HOSPITAL ENCOUNTER (OUTPATIENT)
Dept: MRI IMAGING | Facility: HOSPITAL | Age: 35
Discharge: HOME | End: 2025-03-22
Attending: PHYSICAL MEDICINE & REHABILITATION
Payer: COMMERCIAL

## 2025-03-22 DIAGNOSIS — M47.816 LUMBAR FACET JOINT SYNDROME: ICD-10-CM

## 2025-03-22 PROCEDURE — 72148 MRI LUMBAR SPINE W/O DYE: CPT | Performed by: PHYSICAL MEDICINE & REHABILITATION

## 2025-04-01 ENCOUNTER — MED REC SCAN ONLY (OUTPATIENT)
Dept: FAMILY MEDICINE CLINIC | Facility: CLINIC | Age: 35
End: 2025-04-01

## 2025-04-15 ENCOUNTER — TELEPHONE (OUTPATIENT)
Dept: PHYSICAL MEDICINE AND REHAB | Facility: CLINIC | Age: 35
End: 2025-04-15

## 2025-04-15 ENCOUNTER — TELEMEDICINE (OUTPATIENT)
Dept: PHYSICAL MEDICINE AND REHAB | Facility: CLINIC | Age: 35
End: 2025-04-15
Payer: COMMERCIAL

## 2025-04-15 DIAGNOSIS — N18.6 ESRD (END STAGE RENAL DISEASE) (HCC): ICD-10-CM

## 2025-04-15 DIAGNOSIS — M47.816 LUMBAR FACET JOINT SYNDROME: Primary | ICD-10-CM

## 2025-04-15 DIAGNOSIS — K21.9 GASTROESOPHAGEAL REFLUX DISEASE WITHOUT ESOPHAGITIS: ICD-10-CM

## 2025-04-15 DIAGNOSIS — I10 ESSENTIAL HYPERTENSION: ICD-10-CM

## 2025-04-15 PROCEDURE — 98006 SYNCH AUDIO-VIDEO EST MOD 30: CPT | Performed by: PHYSICAL MEDICINE & REHABILITATION
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
## 2025-04-15 NOTE — TELEPHONE ENCOUNTER
Initiated authorization for Bilateral L3-4, L4-5, L5-S1 Facet joint injection under fluoroscopy guidance. CPT/HCPCS 06212-62, 98877 x's 2, 75847 x'ss 2 dx:M47.816 to be done at [facility] with Avail[facility] portal.    Status: No action required  Reference/Authorization # [id number]  Valid: 4/15/25-7/15/25  Authorization is not required based on medical necessity, however, is not a guarantee of payment and may be subject to review once claim is submitted.

## 2025-04-16 NOTE — TELEPHONE ENCOUNTER
Per Spinal Intervention Reference \"patient does not have to hold ASA or blood thinners when performing lumbar facet or medial branch blocks\"   Patient has been scheduled for  Bilateral L3-4, L4-5, L5-S1 Facet joint injection on 5/5/2025 at the [facility] with Dr. Vasquez.   Anesthesia type:  Local  Please note: The [facility] will call the business day prior to discuss the exact time/arrival and additional instructions for your appointment.  Patient was advised that if he/she does receive the covid vaccine it needs to be at least 2 weeks before or after the injection.  Medications and allergies reviewed.  Patient informed of [facility]'s  policy:  The patient will require transportation arrangements to and from the procedure, with the  present on site for the entire visit.  Without a , the appointment is subject to cancellation.    [facility] is [place] [address].   may park in the yellow/purple parking lot.  Patient verbalized understanding and agrees with plan.  Scheduled in Epic: Yes  Scheduled in Surgical Case: Yes  Follow up appointment made: NOV: Visit date not found  Authorization date valid until 7/15/2025 at [facility].

## 2025-05-05 ENCOUNTER — APPOINTMENT (OUTPATIENT)
Dept: SURGERY | Facility: CLINIC | Age: 35
End: 2025-05-05
Payer: COMMERCIAL

## 2025-05-05 PROBLEM — M47.816 LUMBAR FACET ARTHROPATHY: Status: ACTIVE | Noted: 2025-05-05

## 2025-07-09 ENCOUNTER — MED REC SCAN ONLY (OUTPATIENT)
Dept: FAMILY MEDICINE CLINIC | Facility: CLINIC | Age: 35
End: 2025-07-09

## (undated) NOTE — LETTER
10/16/2019          To Whom It May Concern:    Brenda Pearl is currently under my medical care. Please excuse his absence from  10/12/19- 10/16/19  He may return to work on 10/17/19  Activity is restricted as follows: none.     If you require additional info

## (undated) NOTE — LETTER
[address], [place]  Authorization for Invasive Procedures  Date: 10/16/2023           Time:                 I hereby authorize Dr. Luma Boone, my physician and his/her assistants (if applicable), which may include medical students, residents, and/or fellows, to perform the following surgical operation/ procedure and administer such anesthesia as may be determined necessary by my physician: Tunneled dialysis catheter insertion on [place]  2. I recognize that during the surgical operation/procedure, unforeseen conditions may necessitate additional or different procedures than those listed above. I, therefore, further authorize and request that the above-named surgeon, assistants, or designees perform such procedures as are, in their judgment, necessary and desirable. 3.   My surgeon/physician has discussed prior to my surgery the potential benefits, risks and side effects of this procedure; the likelihood of achieving goals; and potential problems that might occur during recuperation. They also discussed reasonable alternatives to the procedure, including risks, benefits, and side effects related to the alternatives and risks related to not receiving this procedure. I have had all my questions answered and I acknowledge that no guarantee has been made as to the result that may be obtained. 4.   Should the need arise during my operation/procedure, which includes change of level of care prior to discharge, I also consent to the administration of blood and/or blood products. Further, I understand that despite careful testing and screening of blood or blood products by collecting agencies, I may still be subject to ill effects as a result of receiving a blood transfusion and/or blood products.   The following are some, but not all, of the potential risks that can occur: fever and allergic reactions, hemolytic reactions, transmission of diseases such as Hepatitis, AIDS and Cytomegalovirus (CMV) and fluid overload. In the event that I wish to have an autologous transfusion of my own blood, or a directed donor transfusion, I will discuss this with my physician. Check only if Refusing Blood or Blood Products  I understand refusal of blood or blood products as deemed necessary by my physician may have serious consequences to my condition to include possible death. I hereby assume responsibility for my refusal and release the hospital, its personnel, and my physicians from any responsibility for the consequences of my refusal.         o  Refuse         5. I authorize the use of any specimen, organs, tissues, body parts or foreign objects that may be removed from my body during the operation/procedure for diagnosis, research or teaching purposes and their subsequent disposal by hospital authorities. I also authorize the release of specimen test results and/or written reports to my treating physician on the hospital medical staff or other referring or consulting physicians involved in my care, at the discretion of the Pathologist or my treating physician. 6.   I consent to the photographing or videotaping of the operations or procedures to be performed, including appropriate portions of my body for medical, scientific, or educational purposes, provided my identity is not revealed by the pictures or by descriptive texts accompanying them. If the procedure has been photographed/videotaped, the surgeon will obtain the original picture, image, videotape or CD. The hospital will not be responsible for storage, release or maintenance of the picture, image, tape or CD.    7.   I consent to the presence of a  or observers in the operating room as deemed necessary by my physician or their designees. 8.   I recognize that in the event my procedure results in extended X-Ray/fluoroscopy time, I may develop a skin reaction. 9.  If I have a Do Not Attempt Resuscitation (DNAR) order in place, that status will be suspended while in the operating room, procedural suite, and during the recovery period unless otherwise explicitly stated by me (or a person authorized to consent on my behalf). The surgeon or my attending physician will determine when the applicable recovery period ends for purposes of reinstating the DNAR order. 10. Patients having a sterilization procedure: I understand that if the procedure is successful the results will be permanent and it will therefore be impossible for me to inseminate, conceive, or bear children. I also understand that the procedure is intended to result in sterility, although the result has not been guaranteed. 11. I acknowledge that my physician has explained sedation/analgesia administration to me including the risk and benefits I consent to the administration of sedation/analgesia as may be necessary or desirable in the judgment of my physician.     I CERTIFY THAT I HAVE READ AND FULLY UNDERSTAND THE ABOVE CONSENT TO OPERATION and/or OTHER PROCEDURE.        ____________________________________       _________________________________      ______________________________  Signature of Patient         Signature of Responsible Person        Printed Name of Responsible Person        ____________________________________      _________________________________      ______________________________       Signature of Witness          Relationship to Patient                       Date                                       Time    Patient Name: Samira Salazar     : 1990                 Printed: 2023      Medical Record #: [id number]                      Page 1 of 2          STATEMENT OF PHYSICIAN My signature below affirms that prior to the time of the procedure; I have explained to the patient and/or his/her legal representative, the risks and benefits involved in the proposed treatment and any reasonable alternative to the proposed treatment. I have also explained the risks and benefits involved in refusal of the proposed treatment and alternatives to the proposed treatment and have answered the patient's questions.  If I have a significant financial interest in a co-management agreement or a significant financial interest in any product or implant, or other significant relationship used in this procedure/surgery, I have disclosed this and had a discussion with my patient.     _______________________________________________________________ _____________________________  Forrest Quiroz  )                                                                                         (Date)                                   (Time)    Patient Name: Zaki Frank     : 1990                 Printed: 2023      Medical Record #: [id number]                      Page 2 of 2

## (undated) NOTE — LETTER
10/19/2023          To Whom It May Concern:    Renee Friedman is currently under my medical care. He was unable to be attend work due to acute symptoms on 9/21/2023. If you require additional information please contact our office.         Sincerely,      Carmen Walker MD          Document generated by:  Carmen Walker MD

## (undated) NOTE — LETTER
5/13/2024          To Whom It May Concern:    Ryedr Wheeler is currently under my medical care and may not return to work at this time.    Please excuse Ryder for 1 day 5/13/24.  He may return to work on 5/14/24.  Activity is restricted as follows: none.    If you require additional information please contact our office.        Sincerely,    Loco Chavez MD          Document generated by:  Isa MENDOZA CMA

## (undated) NOTE — LETTER
[address], [place]  Authorization for Invasive Procedures  Date: 10/16/2023           Time: 0740    I hereby authorize Dr Willy Tate, my physician and his/her assistants (if applicable), which may include medical students, residents, and/or fellows, to perform the following surgical operation/ procedure and administer such anesthesia as may be determined necessary by my physician: Tunneled HD catheter on [place]  2. I recognize that during the surgical operation/procedure, unforeseen conditions may necessitate additional or different procedures than those listed above. I, therefore, further authorize and request that the above-named surgeon, assistants, or designees perform such procedures as are, in their judgment, necessary and desirable. 3.   My surgeon/physician has discussed prior to my surgery the potential benefits, risks and side effects of this procedure; the likelihood of achieving goals; and potential problems that might occur during recuperation. They also discussed reasonable alternatives to the procedure, including risks, benefits, and side effects related to the alternatives and risks related to not receiving this procedure. I have had all my questions answered and I acknowledge that no guarantee has been made as to the result that may be obtained. 4.   Should the need arise during my operation/procedure, which includes change of level of care prior to discharge, I also consent to the administration of blood and/or blood products. Further, I understand that despite careful testing and screening of blood or blood products by collecting agencies, I may still be subject to ill effects as a result of receiving a blood transfusion and/or blood products.   The following are some, but not all, of the potential risks that can occur: fever and allergic reactions, hemolytic reactions, transmission of diseases such as Hepatitis, AIDS and Cytomegalovirus (CMV) and fluid overload. In the event that I wish to have an autologous transfusion of my own blood, or a directed donor transfusion, I will discuss this with my physician. Check only if Refusing Blood or Blood Products  I understand refusal of blood or blood products as deemed necessary by my physician may have serious consequences to my condition to include possible death. I hereby assume responsibility for my refusal and release the hospital, its personnel, and my physicians from any responsibility for the consequences of my refusal.         o  Refuse         5. I authorize the use of any specimen, organs, tissues, body parts or foreign objects that may be removed from my body during the operation/procedure for diagnosis, research or teaching purposes and their subsequent disposal by hospital authorities. I also authorize the release of specimen test results and/or written reports to my treating physician on the hospital medical staff or other referring or consulting physicians involved in my care, at the discretion of the Pathologist or my treating physician. 6.   I consent to the photographing or videotaping of the operations or procedures to be performed, including appropriate portions of my body for medical, scientific, or educational purposes, provided my identity is not revealed by the pictures or by descriptive texts accompanying them. If the procedure has been photographed/videotaped, the surgeon will obtain the original picture, image, videotape or CD. The hospital will not be responsible for storage, release or maintenance of the picture, image, tape or CD.    7.   I consent to the presence of a  or observers in the operating room as deemed necessary by my physician or their designees. 8.   I recognize that in the event my procedure results in extended X-Ray/fluoroscopy time, I may develop a skin reaction. 9.  If I have a Do Not Attempt Resuscitation (DNAR) order in place, that status will be suspended while in the operating room, procedural suite, and during the recovery period unless otherwise explicitly stated by me (or a person authorized to consent on my behalf). The surgeon or my attending physician will determine when the applicable recovery period ends for purposes of reinstating the DNAR order. 10. Patients having a sterilization procedure: I understand that if the procedure is successful the results will be permanent and it will therefore be impossible for me to inseminate, conceive, or bear children. I also understand that the procedure is intended to result in sterility, although the result has not been guaranteed. 11. I acknowledge that my physician has explained sedation/analgesia administration to me including the risk and benefits I consent to the administration of sedation/analgesia as may be necessary or desirable in the judgment of my physician. I CERTIFY THAT I HAVE READ AND FULLY UNDERSTAND THE ABOVE CONSENT TO OPERATION and/or OTHER PROCEDURE.        ____________________________________       _________________________________      ______________________________  Signature of Patient         Signature of Responsible Person        Printed Name of Responsible Person        ____________________________________      _________________________________      ______________________________       Signature of Witness          Relationship to Patient                       Date                                       Time    Patient Name: Manny Funez     : 1990                 Printed: 2023      Medical Record #: [id number]                      Page 1 of 2          STATEMENT OF PHYSICIAN My signature below affirms that prior to the time of the procedure; I have explained to the patient and/or his/her legal representative, the risks and benefits involved in the proposed treatment and any reasonable alternative to the proposed treatment.  I have also explained the risks and benefits involved in refusal of the proposed treatment and alternatives to the proposed treatment and have answered the patient's questions.  If I have a significant financial interest in a co-management agreement or a significant financial interest in any product or implant, or other significant relationship used in this procedure/surgery, I have disclosed this and had a discussion with my patient.     _______________________________________________________________ _____________________________  Speedy [place] of Physician)                                                                                         (Date)                                   (Time)    Patient Name: Timoteo Rico     : 1990                 Printed: 2023      Medical Record #: [id number]                      Page 2 of 2

## (undated) NOTE — LETTER
12/7/2020          To Whom It May Concern:    Daisy Marin is currently under my medical care and may return to work at this time. Please excuse Aracely Marques 11/30/2020-12/2/2020 and 12/7/2020. He may return to work on 12/8/2020.   Activity is restricted as fo

## (undated) NOTE — LETTER
Date: 1/1/2019    Patient Name: Justin Hollins      To Whom it may concern: This letter has been written at the patient's request. The above patient was seen at the [facility] for treatment of a medical condition.     This patient should be ex

## (undated) NOTE — LETTER
6/9/2020          To Whom It May Concern:    Daisy Marin is currently under my medical care. Due to a chronic medical condition, he is at high risk for complications if he contracts COVID 19. He has been advised to quarantine.    If you require additional

## (undated) NOTE — LETTER
10/7/2020          To Whom It May Concern:    Irasema Chopra is currently under my medical care. On 9/12/2020 he was unable to work to due to acute medical symptoms. If you require additional information please contact our office.         Sincerely,      S

## (undated) NOTE — ED AVS SNAPSHOT
Lavinia Marie   MRN: [id number]    Department:  [facility] Emergency Department   Date of Visit:  10/12/2019           Disclosure     Insurance plans vary and the physician(s) referred by the ER may not be covered by your plan.  Please contact you CARE PHYSICIAN AT ONCE OR RETURN IMMEDIATELY TO THE EMERGENCY DEPARTMENT. If you have been prescribed any medication(s), please fill your prescription right away and begin taking the medication(s) as directed.   If you believe that any of the medications

## (undated) NOTE — LETTER
3/7/2025        Ryder Wheeler        [address] [facility] [address]         To Whom It May Concern,    Ryder Wheeler is under my medical care.  He receive hemodialysis three days a week.  He often experiences symptoms of generalized weakness and fatigue between treatments which makes commuting to work difficult.  Please allow my patient to work remotely for the next six months while he awaits possible kidney transplant.  I appreciate your cooperation in this matter.    If you require additional information, please contact our office.    Sincerely,      Loco Chavez MD  [address] [place] [id number]  Ph: [phone]  Fax: [phone]        Document electronically generated by:  Maura PIERCE RN

## (undated) NOTE — LETTER
12/6/2024      To Whom It May Concern:    Ryder Wheeler is currently under my medical care.  Please excuse Ryder from work on 12/2 and 12/4.  If you require additional information please contact our office.      Sincerely,      Loco Chavez MD  [address] [place] [id number]  Ph: [phone]  Fax: [phone]                   Document generated by:  Cyndi PIERCE RN

## (undated) NOTE — LETTER
10/24/2023          To Whom It May Concern:    Reji Hale is currently under my medical care and was seen in clinic today. He is cleared to return on Tuesday, 10/31/23. If you require additional information please contact our office.         Sincerely,      Augusta Ogden MD          Document generated by:  Augusta Ogden MD

## (undated) NOTE — LETTER
12/16/2024      To Whom it May Concern:      Ryder Wheeler is currently under my medical care.  Please excuse Ryder from work on 12/10.  If you require additional information please contact our office.    Sincerely,      Loco Chavez MD  [address] [place] [id number]  Ph: [phone]  Fax: [phone]            Document electronically generated by:  Mariia EASON RN

## (undated) NOTE — LETTER
10/18/2023          To Whom It May Concern:    Blade Rm is currently hospitalized and may not return to work at this time. Please excuse Keerthi Gurrola beginning October 11, 2023. We will update you when he is discharged. If you require additional information please contact our office.         Sincerely,      Willy Mckeon MD          Document generated by:  Tanmay Doyle RN

## (undated) NOTE — LETTER
12/6/2021          To Whom It May Concern:    Ana Maria Lyle is currently under my medical care. Please excuse his absence from 11/29/21 - 12/6/2021. He is cleared to return to work on 12/7/21 without restrictions.      If you require additional information

## (undated) NOTE — Clinical Note
TCM call completed. A TCM-HFU appointment is scheduled for 10/24/2023. The patient would like a dietician referral. NCM did contact cardiology to confirm Rx nifedipine dosing. Thank you.

## (undated) NOTE — LETTER
12/29/2021      To Whom It May Concern:    Piter Galindo is currently under our medical care. Please excuse Irma Taylor for 12/21/21 and 12/27/21. Activity is restricted as follows: none    If you require additional information please contact our office.

## (undated) NOTE — LETTER
11/28/2023      To Whom It May Concern:    Reji Hale is currently under my medical care. Please excuse Gregorio Damian from 11/22/23 to 11/28/23. He may return to work on 11/29/23. Activity is restricted as follows: none. If you require additional information please contact our office.       Sincerely,      Augusta Ogden MD  [facility] [address] [facility]   [phone]      Document generated by:  Shelli Welch RN